# Patient Record
Sex: FEMALE | Race: WHITE | Employment: UNEMPLOYED | URBAN - METROPOLITAN AREA
[De-identification: names, ages, dates, MRNs, and addresses within clinical notes are randomized per-mention and may not be internally consistent; named-entity substitution may affect disease eponyms.]

---

## 2020-12-28 ENCOUNTER — APPOINTMENT (OUTPATIENT)
Dept: GENERAL RADIOLOGY | Age: 50
End: 2020-12-28
Payer: MEDICAID

## 2020-12-28 ENCOUNTER — APPOINTMENT (OUTPATIENT)
Dept: MRI IMAGING | Age: 50
End: 2020-12-28
Payer: MEDICAID

## 2020-12-28 ENCOUNTER — HOSPITAL ENCOUNTER (EMERGENCY)
Age: 50
Discharge: HOME | End: 2020-12-28
Attending: EMERGENCY MEDICINE
Payer: MEDICAID

## 2020-12-28 ENCOUNTER — APPOINTMENT (OUTPATIENT)
Dept: CT IMAGING | Age: 50
End: 2020-12-28
Payer: MEDICAID

## 2020-12-28 VITALS
SYSTOLIC BLOOD PRESSURE: 107 MMHG | DIASTOLIC BLOOD PRESSURE: 88 MMHG | OXYGEN SATURATION: 99 % | HEART RATE: 89 BPM | HEIGHT: 68 IN | RESPIRATION RATE: 13 BRPM | BODY MASS INDEX: 24.25 KG/M2 | WEIGHT: 160 LBS | TEMPERATURE: 98 F

## 2020-12-28 DIAGNOSIS — R91.1 PULMONARY NODULE: ICD-10-CM

## 2020-12-28 DIAGNOSIS — D53.9 MACROCYTIC ANEMIA: ICD-10-CM

## 2020-12-28 DIAGNOSIS — R52 GENERALIZED BODY ACHES: Primary | ICD-10-CM

## 2020-12-28 DIAGNOSIS — J44.1 COPD EXACERBATION (HCC): ICD-10-CM

## 2020-12-28 DIAGNOSIS — M54.50 BILATERAL LOW BACK PAIN WITHOUT SCIATICA, UNSPECIFIED CHRONICITY: ICD-10-CM

## 2020-12-28 LAB
A/G RATIO: 1.4 (ref 1.1–2.2)
ALBUMIN SERPL-MCNC: 3.8 G/DL (ref 3.4–5)
ALP BLD-CCNC: 75 U/L (ref 40–129)
ALT SERPL-CCNC: 9 U/L (ref 10–40)
AMPHETAMINE SCREEN, URINE: NORMAL
ANION GAP SERPL CALCULATED.3IONS-SCNC: 8 MMOL/L (ref 3–16)
AST SERPL-CCNC: 13 U/L (ref 15–37)
BARBITURATE SCREEN URINE: NORMAL
BASOPHILS ABSOLUTE: 0 K/UL (ref 0–0.2)
BASOPHILS RELATIVE PERCENT: 0.7 %
BENZODIAZEPINE SCREEN, URINE: NORMAL
BILIRUB SERPL-MCNC: <0.2 MG/DL (ref 0–1)
BILIRUBIN URINE: NEGATIVE
BLOOD, URINE: ABNORMAL
BUN BLDV-MCNC: 30 MG/DL (ref 7–20)
CALCIUM SERPL-MCNC: 9.2 MG/DL (ref 8.3–10.6)
CANNABINOID SCREEN URINE: NORMAL
CHLORIDE BLD-SCNC: 103 MMOL/L (ref 99–110)
CLARITY: CLEAR
CO2: 27 MMOL/L (ref 21–32)
COCAINE METABOLITE SCREEN URINE: NORMAL
COLOR: YELLOW
CREAT SERPL-MCNC: 1.3 MG/DL (ref 0.6–1.1)
EKG ATRIAL RATE: 83 BPM
EKG DIAGNOSIS: NORMAL
EKG P AXIS: 67 DEGREES
EKG P-R INTERVAL: 170 MS
EKG Q-T INTERVAL: 366 MS
EKG QRS DURATION: 82 MS
EKG QTC CALCULATION (BAZETT): 430 MS
EKG R AXIS: 58 DEGREES
EKG T AXIS: 52 DEGREES
EKG VENTRICULAR RATE: 83 BPM
EOSINOPHILS ABSOLUTE: 0.3 K/UL (ref 0–0.6)
EOSINOPHILS RELATIVE PERCENT: 5.6 %
EPITHELIAL CELLS, UA: NORMAL /HPF (ref 0–5)
FIBRINOGEN: 366 MG/DL (ref 200–397)
FOLATE: 8.43 NG/ML (ref 4.78–24.2)
GFR AFRICAN AMERICAN: 52
GFR NON-AFRICAN AMERICAN: 43
GLOBULIN: 2.7 G/DL
GLUCOSE BLD-MCNC: 88 MG/DL (ref 70–99)
GLUCOSE URINE: NEGATIVE MG/DL
HCG QUALITATIVE: NEGATIVE
HCT VFR BLD CALC: 25.6 % (ref 36–48)
HEMOGLOBIN: 8.6 G/DL (ref 12–16)
INR BLD: 1.08 (ref 0.86–1.14)
IRON SATURATION: 32 % (ref 15–50)
IRON: 78 UG/DL (ref 37–145)
KETONES, URINE: NEGATIVE MG/DL
LACTATE DEHYDROGENASE: 152 U/L (ref 100–190)
LEUKOCYTE ESTERASE, URINE: ABNORMAL
LYMPHOCYTES ABSOLUTE: 1.5 K/UL (ref 1–5.1)
LYMPHOCYTES RELATIVE PERCENT: 27.6 %
Lab: NORMAL
MCH RBC QN AUTO: 33.9 PG (ref 26–34)
MCHC RBC AUTO-ENTMCNC: 33.5 G/DL (ref 31–36)
MCV RBC AUTO: 101.4 FL (ref 80–100)
METHADONE SCREEN, URINE: NORMAL
MICROSCOPIC EXAMINATION: YES
MONOCYTES ABSOLUTE: 0.5 K/UL (ref 0–1.3)
MONOCYTES RELATIVE PERCENT: 8.9 %
NEUTROPHILS ABSOLUTE: 3.1 K/UL (ref 1.7–7.7)
NEUTROPHILS RELATIVE PERCENT: 57.2 %
NITRITE, URINE: NEGATIVE
OPIATE SCREEN URINE: NORMAL
OXYCODONE URINE: NORMAL
PDW BLD-RTO: 13.3 % (ref 12.4–15.4)
PH UA: 6
PH UA: 6 (ref 5–8)
PHENCYCLIDINE SCREEN URINE: NORMAL
PLATELET # BLD: 243 K/UL (ref 135–450)
PMV BLD AUTO: 7.4 FL (ref 5–10.5)
POTASSIUM REFLEX MAGNESIUM: 4.2 MMOL/L (ref 3.5–5.1)
PRO-BNP: 89 PG/ML (ref 0–124)
PROCALCITONIN: 0.04 NG/ML (ref 0–0.15)
PROPOXYPHENE SCREEN: NORMAL
PROTEIN UA: NEGATIVE MG/DL
PROTHROMBIN TIME: 12.5 SEC (ref 10–13.2)
RBC # BLD: 2.52 M/UL (ref 4–5.2)
RBC UA: NORMAL /HPF (ref 0–4)
SARS-COV-2, NAAT: NOT DETECTED
SEDIMENTATION RATE, ERYTHROCYTE: 42 MM/HR (ref 0–30)
SODIUM BLD-SCNC: 138 MMOL/L (ref 136–145)
SPECIFIC GRAVITY UA: 1.01 (ref 1–1.03)
TOTAL IRON BINDING CAPACITY: 247 UG/DL (ref 260–445)
TOTAL PROTEIN: 6.5 G/DL (ref 6.4–8.2)
TROPONIN: <0.01 NG/ML
TSH REFLEX: 0.47 UIU/ML (ref 0.27–4.2)
URINE REFLEX TO CULTURE: ABNORMAL
URINE TYPE: ABNORMAL
UROBILINOGEN, URINE: 0.2 E.U./DL
VITAMIN B-12: 296 PG/ML (ref 211–911)
VITAMIN D 25-HYDROXY: 20.1 NG/ML
WBC # BLD: 5.4 K/UL (ref 4–11)
WBC UA: NORMAL /HPF (ref 0–5)

## 2020-12-28 PROCEDURE — 84443 ASSAY THYROID STIM HORMONE: CPT

## 2020-12-28 PROCEDURE — 84484 ASSAY OF TROPONIN QUANT: CPT

## 2020-12-28 PROCEDURE — 85384 FIBRINOGEN ACTIVITY: CPT

## 2020-12-28 PROCEDURE — 83550 IRON BINDING TEST: CPT

## 2020-12-28 PROCEDURE — 96375 TX/PRO/DX INJ NEW DRUG ADDON: CPT

## 2020-12-28 PROCEDURE — U0003 INFECTIOUS AGENT DETECTION BY NUCLEIC ACID (DNA OR RNA); SEVERE ACUTE RESPIRATORY SYNDROME CORONAVIRUS 2 (SARS-COV-2) (CORONAVIRUS DISEASE [COVID-19]), AMPLIFIED PROBE TECHNIQUE, MAKING USE OF HIGH THROUGHPUT TECHNOLOGIES AS DESCRIBED BY CMS-2020-01-R: HCPCS

## 2020-12-28 PROCEDURE — 6360000004 HC RX CONTRAST MEDICATION: Performed by: EMERGENCY MEDICINE

## 2020-12-28 PROCEDURE — 6370000000 HC RX 637 (ALT 250 FOR IP): Performed by: PHYSICIAN ASSISTANT

## 2020-12-28 PROCEDURE — 72158 MRI LUMBAR SPINE W/O & W/DYE: CPT

## 2020-12-28 PROCEDURE — 85025 COMPLETE CBC W/AUTO DIFF WBC: CPT

## 2020-12-28 PROCEDURE — 2580000003 HC RX 258: Performed by: PHYSICIAN ASSISTANT

## 2020-12-28 PROCEDURE — 6370000000 HC RX 637 (ALT 250 FOR IP): Performed by: EMERGENCY MEDICINE

## 2020-12-28 PROCEDURE — 93010 ELECTROCARDIOGRAM REPORT: CPT | Performed by: INTERNAL MEDICINE

## 2020-12-28 PROCEDURE — 83615 LACTATE (LD) (LDH) ENZYME: CPT

## 2020-12-28 PROCEDURE — U0002 COVID-19 LAB TEST NON-CDC: HCPCS

## 2020-12-28 PROCEDURE — 84145 PROCALCITONIN (PCT): CPT

## 2020-12-28 PROCEDURE — 72157 MRI CHEST SPINE W/O & W/DYE: CPT

## 2020-12-28 PROCEDURE — 85610 PROTHROMBIN TIME: CPT

## 2020-12-28 PROCEDURE — 80053 COMPREHEN METABOLIC PANEL: CPT

## 2020-12-28 PROCEDURE — 82728 ASSAY OF FERRITIN: CPT

## 2020-12-28 PROCEDURE — 82746 ASSAY OF FOLIC ACID SERUM: CPT

## 2020-12-28 PROCEDURE — 81001 URINALYSIS AUTO W/SCOPE: CPT

## 2020-12-28 PROCEDURE — A9579 GAD-BASE MR CONTRAST NOS,1ML: HCPCS | Performed by: EMERGENCY MEDICINE

## 2020-12-28 PROCEDURE — 85652 RBC SED RATE AUTOMATED: CPT

## 2020-12-28 PROCEDURE — 71045 X-RAY EXAM CHEST 1 VIEW: CPT

## 2020-12-28 PROCEDURE — 96374 THER/PROPH/DIAG INJ IV PUSH: CPT

## 2020-12-28 PROCEDURE — 6360000002 HC RX W HCPCS: Performed by: EMERGENCY MEDICINE

## 2020-12-28 PROCEDURE — 86140 C-REACTIVE PROTEIN: CPT

## 2020-12-28 PROCEDURE — 80307 DRUG TEST PRSMV CHEM ANLYZR: CPT

## 2020-12-28 PROCEDURE — 71260 CT THORAX DX C+: CPT

## 2020-12-28 PROCEDURE — 83880 ASSAY OF NATRIURETIC PEPTIDE: CPT

## 2020-12-28 PROCEDURE — 93005 ELECTROCARDIOGRAM TRACING: CPT | Performed by: PHYSICIAN ASSISTANT

## 2020-12-28 PROCEDURE — 82306 VITAMIN D 25 HYDROXY: CPT

## 2020-12-28 PROCEDURE — 6360000002 HC RX W HCPCS: Performed by: PHYSICIAN ASSISTANT

## 2020-12-28 PROCEDURE — 82607 VITAMIN B-12: CPT

## 2020-12-28 PROCEDURE — 84703 CHORIONIC GONADOTROPIN ASSAY: CPT

## 2020-12-28 PROCEDURE — 83540 ASSAY OF IRON: CPT

## 2020-12-28 PROCEDURE — 99285 EMERGENCY DEPT VISIT HI MDM: CPT

## 2020-12-28 RX ORDER — DOXYCYCLINE HYCLATE 100 MG
100 TABLET ORAL 2 TIMES DAILY
Qty: 20 TABLET | Refills: 0 | Status: SHIPPED | OUTPATIENT
Start: 2020-12-28 | End: 2021-01-07

## 2020-12-28 RX ORDER — DEXAMETHASONE SODIUM PHOSPHATE 10 MG/ML
4 INJECTION, SOLUTION INTRAMUSCULAR; INTRAVENOUS ONCE
Status: COMPLETED | OUTPATIENT
Start: 2020-12-28 | End: 2020-12-28

## 2020-12-28 RX ORDER — FENTANYL CITRATE 50 UG/ML
50 INJECTION, SOLUTION INTRAMUSCULAR; INTRAVENOUS ONCE
Status: COMPLETED | OUTPATIENT
Start: 2020-12-28 | End: 2020-12-28

## 2020-12-28 RX ORDER — 0.9 % SODIUM CHLORIDE 0.9 %
1000 INTRAVENOUS SOLUTION INTRAVENOUS ONCE
Status: COMPLETED | OUTPATIENT
Start: 2020-12-28 | End: 2020-12-28

## 2020-12-28 RX ORDER — PREDNISONE 10 MG/1
TABLET ORAL
Qty: 30 TABLET | Refills: 0 | Status: SHIPPED | OUTPATIENT
Start: 2020-12-28 | End: 2021-01-07

## 2020-12-28 RX ORDER — ALBUTEROL SULFATE 90 UG/1
2 AEROSOL, METERED RESPIRATORY (INHALATION) EVERY 4 HOURS PRN
Qty: 1 INHALER | Refills: 1 | Status: SHIPPED | OUTPATIENT
Start: 2020-12-28

## 2020-12-28 RX ORDER — ACETAMINOPHEN 325 MG/1
650 TABLET ORAL ONCE
Status: COMPLETED | OUTPATIENT
Start: 2020-12-28 | End: 2020-12-28

## 2020-12-28 RX ORDER — IPRATROPIUM BROMIDE AND ALBUTEROL SULFATE 2.5; .5 MG/3ML; MG/3ML
1 SOLUTION RESPIRATORY (INHALATION) ONCE
Status: COMPLETED | OUTPATIENT
Start: 2020-12-28 | End: 2020-12-28

## 2020-12-28 RX ADMIN — FENTANYL CITRATE 50 MCG: 50 INJECTION, SOLUTION INTRAMUSCULAR; INTRAVENOUS at 16:54

## 2020-12-28 RX ADMIN — GADOTERIDOL 14 ML: 279.3 INJECTION, SOLUTION INTRAVENOUS at 18:29

## 2020-12-28 RX ADMIN — IPRATROPIUM BROMIDE AND ALBUTEROL SULFATE 1 AMPULE: .5; 2.5 SOLUTION RESPIRATORY (INHALATION) at 21:45

## 2020-12-28 RX ADMIN — SODIUM CHLORIDE 1000 ML: 9 INJECTION, SOLUTION INTRAVENOUS at 14:45

## 2020-12-28 RX ADMIN — HYDROMORPHONE HYDROCHLORIDE 0.5 MG: 1 INJECTION, SOLUTION INTRAMUSCULAR; INTRAVENOUS; SUBCUTANEOUS at 14:45

## 2020-12-28 RX ADMIN — DEXAMETHASONE SODIUM PHOSPHATE 4 MG: 10 INJECTION, SOLUTION INTRAMUSCULAR; INTRAVENOUS at 21:43

## 2020-12-28 RX ADMIN — IOPAMIDOL 85 ML: 755 INJECTION, SOLUTION INTRAVENOUS at 15:30

## 2020-12-28 RX ADMIN — ACETAMINOPHEN 650 MG: 325 TABLET ORAL at 14:45

## 2020-12-28 ASSESSMENT — PAIN DESCRIPTION - PAIN TYPE: TYPE: ACUTE PAIN

## 2020-12-28 ASSESSMENT — PAIN DESCRIPTION - LOCATION: LOCATION: BACK

## 2020-12-28 ASSESSMENT — PAIN SCALES - GENERAL
PAINLEVEL_OUTOF10: 8
PAINLEVEL_OUTOF10: 6

## 2020-12-28 ASSESSMENT — PAIN DESCRIPTION - DESCRIPTORS: DESCRIPTORS: BURNING

## 2020-12-28 NOTE — ED NOTES
Pt ambulated to restroom with pulse ox and dropped to 70's at this time, pt is slowly recovering on her own.  Dr. Calin Dozier made aware      Helen Hamman, RN  12/28/20 2349

## 2020-12-28 NOTE — ED PROVIDER NOTES
[address] ED  EMERGENCY DEPARTMENT ENCOUNTER        Pt Name: Debora Caicedo  MRN: [id number]  Justinegflucía 1970  Date of evaluation: 12/28/2020  Provider: Radha Beard PA-C  PCP: No primary care provider on file. I have seen and evaluated this patient with my supervising physician Nahid Idalmis, [facility]       Chief Complaint   Patient presents with    Concern For COVID-19     chest pain, sob and body aches.  had an exposur. e to covid       HISTORY OF PRESENT ILLNESS   (Location, Timing/Onset, Context/Setting, Quality, Duration, Modifying Factors, Severity, Associated Signs and Symptoms)  Note limiting factors. Debora Caicedo is a 48 y.o. 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 Last used 28 days ago. Patient states over the past 72 hours she has had progressive mid back pain mostly toward the left. Staff there thought she might have unerupted shingles or internal shingles. She never did have a clear rash though she is uncertain. She has no rash at this time. She indicates general body pain and general body burning. She indicates she feels somewhat short of breath particularly when she exerts. She does have stairs up to her area and this does not appreciably increase her shortness of breath. She does however have history of COPD/asthma and she is a current smoker. She indicates no chest pain or pleuritic pain. She indicates no fevers or chills. The patient is intolerant to aspirin and NSAIDs. She is anemic and she is aware that she is anemic for quite some time. She indicates her stool is brown in color. She does have known history hypertension and currently on lisinopril 5 mg. At this time she is hypotensive but not symptomatic. She reports no history of DVT or PE.     There was initial concern regarding Covid because she has myalgia and shortness of breath. Patient's initial blood pressure 77/57 with pulse 84. She is not on beta-blocker. Her care medications include buprenorphine in 8/2, hydroxyzine 25, Mirapex 0.25 mg butyryl MDI as needed, omeprazole 20 mg, levothyroxine 75 mcg, lisinopril 5 mg, melatonin 5 mg, trazodone 50 mg, Claritin 10 mg, Cymbalta 30 mg, Lipitor 20 mg, naproxen 500 mg, Zoloft 25 mg and gabapentin 400 mg. Nursing Notes were all reviewed and agreed with or any disagreements were addressed in the HPI. REVIEW OF SYSTEMS    (2-9 systems for level 4, 10 or more for level 5)     Review of Systems    Positives and Pertinent negatives as per HPI. Except as noted above in the ROS, all other systems were reviewed and negative. PAST MEDICAL HISTORY     Past Medical History:   Diagnosis Date    [facility]     COPD (chronic obstructive pulmonary disease) ([facility] [facility]          SURGICAL HISTORY     Past Surgical History:   Procedure Laterality Date    CHOLECYSTECTOMY      HYSTERECTOMY      THYROIDECTOMY           CURRENTMEDICATIONS       Discharge Medication List as of 12/28/2020 10:05 PM            ALLERGIES     Aspirin, Toradol [ketorolac tromethamine], Tramadol, and Vicodin [hydrocodone-acetaminophen]    FAMILYHISTORY     History reviewed. No pertinent family history. SOCIAL HISTORY       Social History     Tobacco Use    Smoking status: Current Every Day Smoker   Substance Use Topics    Alcohol use: Never     Frequency: Never    Drug use: Never       SCREENINGS             PHYSICAL EXAM    (up to 7 for level 4, 8 or more for level 5)     ED Triage Vitals   BP Temp Temp Source Pulse Resp SpO2 Height Weight   12/28/20 1302 12/28/20 1300 12/28/20 1300 12/28/20 1300 12/28/20 1300 12/28/20 1300 12/28/20 1300 12/28/20 1300   (!) 77/57 98 °F (36.7 °C) Oral 84 18 97 % 5' 8\" (1.727 m) 160 lb (72.6 kg)       Physical Exam  Vitals signs and nursing note reviewed. Constitutional:       Appearance: Normal appearance. She is well-developed and normal weight. HENT:      Head: Normocephalic and atraumatic. Right Ear: External ear normal.      Left Ear: External ear normal.   Eyes:      General: No scleral icterus. Right eye: No discharge. Left eye: No discharge. Conjunctiva/sclera: Conjunctivae normal.   Neck:      Musculoskeletal: Normal range of motion and neck supple. Cardiovascular:      Rate and Rhythm: Normal rate and regular rhythm. Heart sounds: Normal heart sounds. Pulmonary:      Effort: Pulmonary effort is normal.      Breath sounds: Normal breath sounds. Abdominal:      General: Abdomen is flat. Bowel sounds are normal.   Musculoskeletal: Normal range of motion. General: Tenderness present. Back:       Right lower leg: No edema. Left lower leg: No edema. Skin:     General: Skin is warm and dry. Findings: No rash. Comments: The patient without neurosensory deficit to suggest skin hypersensitivity. Neurological:      General: No focal deficit present. Mental Status: She is alert and oriented to person, place, and time. Mental status is at baseline. Psychiatric:         Mood and Affect: Mood normal.         Behavior: Behavior normal.         Thought Content:  Thought content normal.         Judgment: Judgment normal.         DIAGNOSTIC RESULTS   LABS:    Labs Reviewed   VITAMIN D 25 HYDROXY - Abnormal; Notable for the following components:       Result Value    Vit D, 25-Hydroxy 20.1 (*)     All other components within normal limits    Narrative:     Performed at:  [address]   Phone [phone]   CBC WITH AUTO DIFFERENTIAL - Abnormal; Notable for the following components:    RBC 2.52 (*)     Hemoglobin 8.6 (*)     Hematocrit 25.6 (*)     .4 (*)     All other components within normal limits    Narrative:     Performed at:  [facility]  [facility]   Phone [phone]   COMPREHENSIVE METABOLIC PANEL W/ REFLEX TO MG FOR LOW K - Abnormal; Notable for the following components:    BUN 30 (*)     CREATININE 1.3 (*)     GFR Non- 43 (*)     GFR  52 (*)     ALT 9 (*)     AST 13 (*)     All other components within normal limits    Narrative:     Performed at:  [facility],  [place], [facility]   Phone [phone] - Abnormal; Notable for the following components:    Ferritin 199.3 (*)     All other components within normal limits    Narrative:     Performed at:  [facility]  [address] [place] [employer] [employer] [facility] [address]   Phone [phone]   IRON AND TIBC - Abnormal; Notable for the following components:    TIBC 247 (*)     All other components within normal limits    Narrative:     Performed at:  [facility]  [address], [address] [facility] [address]   Phone [phone]   URINE RT REFLEX TO CULTURE - Abnormal; Notable for the following components:    Blood, Urine SMALL (*)     Leukocyte Esterase, Urine TRACE (*)     All other components within normal limits    Narrative:     Performed at:  [facility]) - [facility],  [place], [facility]   Phone [phone]   SEDIMENTATION RATE - Abnormal; Notable for the following components:    Sed Rate 42 (*)     All other components within normal limits    Narrative:     Performed at:  [facility],  [place], [facility]   Phone [phone]   TROPONIN    Narrative:     Performed at:  [facility],  [place], [facility]   Phone [phone]   BRAIN NATRIURETIC PEPTIDE    Narrative:     Performed at:  [facility]) - [facility],  [place], [facility] [address]   Phone [phone]   PROTIME-INR    Narrative:     Performed at:  [facility]) - [facility],  [place], [facility]   Phone [phone]   C-REACTIVE PROTEIN    Narrative:     Performed at:  [facility]  [address] [place] [facility] [place] [address]   Phone [phone]   LACTATE DEHYDROGENASE    Narrative:     Performed at:  [facility],  [place], [facility]   Phone [phone]   FIBRINOGEN    Narrative:     Performed at:  [address] [address],  [place], [facility]   Phone [phone]   PROCALCITONIN    Narrative:     Performed at:  [facility],  [place], [facility]   Phone [phone]    Narrative:     Performed at:  [address] [address],  [place], [facility]   Phone [phone]   TSH WITH REFLEX    Narrative:     Performed at:  [address] [place] [address],  [place], [facility]   Phone [phone]   URINE DRUG SCREEN    Narrative:     Performed at:  [address] [place] [address],  [place], [facility]   Phone [phone]   MICROSCOPIC URINALYSIS    Narrative:     Performed at:  [address] [place] [address],  [place], [facility]   Phone [phone]   HCG, SERUM, QUALITATIVE    Narrative:     Performed at:  [address] [place] [address],  [place], [facility]   Phone [phone]   VITAMIN B12 & FOLATE    Narrative:     Performed at:  [facility]  [address] [facility] [place] [address]   Phone [phone]    Narrative:     Performed at:  [facility] [address]  [address] [facility] [place] [address]   Phone [phone]       All other labs were within normal range or not returned as of this dictation. EKG: All EKG's are interpreted by the Emergency Department Physician in the absence of a cardiologist.  Please see their note for interpretation of EKG. RADIOLOGY:   Non-plain film images such as CT, Ultrasound and MRI are read by the radiologist. Plain radiographic images are visualized and preliminarily interpreted by the ED Provider with the below findings:        Interpretation per the Radiologist below, if available at the time of this note:    MRI [address]   Final Result   1. No evidence to suggest an infectious process involving the thoracolumbar   spine. 2. Degenerative changes contribute to neural foraminal narrowing of the   lumbar spine as above. 3. No significant spinal canal stenosis of the thoracolumbar spine. MRI LUMBAR SPINE W WO CONTRAST   Final Result   1. No evidence to suggest an infectious process involving the thoracolumbar   spine. 2. Degenerative changes contribute to neural foraminal narrowing of the   lumbar spine as above. 3. No significant spinal canal stenosis of the thoracolumbar spine. CT CHEST PULMONARY EMBOLISM W CONTRAST   Final Result   1. 3 mm solid right middle lobe pulmonary nodule. RECOMMENDATION:   Fleischner Society guidelines for follow-up and management of incidentally   detected pulmonary nodules:      Single Solid Nodule:      Nodule size less than 6 mm   In a low-risk patient, no routine follow-up. In a high-risk patient, optional CT at 12 months. XR CHEST PORTABLE   Final Result   No consolidation. Trace scoliosis. Xr Chest Portable    Result Date: 12/28/2020  EXAMINATION: ONE XRAY VIEW OF THE CHEST 12/28/2020 1:13 pm COMPARISON: None.  HISTORY: ORDERING SYSTEM PROVIDED HISTORY: Shortness of breath TECHNOLOGIST PROVIDED HISTORY: Reason for exam:->Shortness of breath Reason for Exam: shortness of breath Acuity: Acute Type of Exam: Initial FINDINGS: The lungs are clear. The cardiac silhouette is unremarkable. There are no pleural effusions. There is no pneumothorax. No consolidation. Trace scoliosis. PROCEDURES   Unless otherwise noted below, none     Procedures    CRITICAL CARE TIME   N/A    CONSULTS:  IP CONSULT TO HOSPITALIST      EMERGENCY DEPARTMENT COURSE and DIFFERENTIAL DIAGNOSIS/MDM:   Vitals:    Vitals:    12/28/20 1506 12/28/20 1634 12/28/20 2035 12/28/20 2108   BP: 102/74 100/69 100/66 107/88   Pulse: 80 79 74 89   Resp: 16 18 9 13   Temp:       TempSrc:       SpO2: 98% 100% 98% 99%   Weight:       Height:           Patient was given the following medications:  Medications   acetaminophen (TYLENOL) tablet 650 mg (650 mg Oral Given 12/28/20 1445)   0.9 % sodium chloride bolus (0 mLs Intravenous Stopped 12/28/20 1548)   HYDROmorphone (DILAUDID) injection 0.5 mg (0.5 mg Intravenous Given 12/28/20 1445)   iopamidol (ISOVUE-370) 76 % injection 85 mL (85 mLs Intravenous Given 12/28/20 1530)   fentaNYL (SUBLIMAZE) injection 50 mcg (50 mcg Intravenous Given 12/28/20 1654)   gadoteridol (PROHANCE) injection 14 mL (14 mLs Intravenous Given 12/28/20 1829)   dexamethasone (PF) (DECADRON) injection 4 mg (4 mg Intravenous Given 12/28/20 2143)   ipratropium-albuterol (DUONEB) nebulizer solution 1 ampule (1 ampule Inhalation Given 12/28/20 2145)         My shift ends and the final disposition of this patient will be managed by my attending physician. FINAL IMPRESSION      1. Generalized body aches    2. COPD exacerbation ([address]    3. Bilateral low back pain without sciatica, unspecified chronicity    4. Pulmonary nodule    5.  Macrocytic anemia          DISPOSITION/PLAN   DISPOSITION        PATIENT REFERREDTO:  [facility]) Pre-Services  [phone]          DISCHARGE MEDICATIONS:  Discharge Medication List as of 12/28/2020 10:05 PM      START taking these medications    Details   predniSONE (DELTASONE) 10 MG tablet Take 5 tablets by mouth daily for 2 days, THEN 4 tablets daily for 2 days, THEN 3 tablets daily for 2 days, THEN 2 tablets daily for 2 days, THEN 1 tablet daily for 2 days. , Disp-30 tablet, R-0Print      albuterol sulfate HFA (PROVENTIL HFA) 108 (90 Base) MCG/ACT inhaler Inhale 2 puffs into the lungs every 4 hours as needed for Wheezing, Disp-1 Inhaler, R-1Print      doxycycline hyclate (VIBRA-TABS) 100 MG tablet Take 1 tablet by mouth 2 times daily for 10 days, Disp-20 tablet, R-0Print             DISCONTINUED MEDICATIONS:  Discharge Medication List as of 12/28/2020 10:05 PM                 (Please note that portions of this note were completed with a voice recognition program.  Efforts were made to edit the dictations but occasionally words are mis-transcribed. )    Gabriela Jenkins PA-C (electronically signed)           Gabriela Jenkins PA-C  01/04/21 [id number]

## 2020-12-29 ENCOUNTER — CARE COORDINATION (OUTPATIENT)
Dept: CARE COORDINATION | Age: 50
End: 2020-12-29

## 2020-12-29 LAB
C-REACTIVE PROTEIN: 1.3 MG/L (ref 0–5.1)
FERRITIN: 199.3 NG/ML (ref 15–150)
SARS-COV-2: NOT DETECTED

## 2020-12-29 NOTE — CARE COORDINATION
No additional outreach. Spoke with the daughter at home and she told me the patient was currently at rehab facility.

## 2020-12-29 NOTE — ED NOTES
Pt ambulated around nurse station with pulse ox at this time and pt sats stayed 93% and above the whole time, pt tolerated well. Pt stated she was feeling much better. Dr. Donta Silverman made aware.       Marika Nguyen RN  12/28/20 2902

## 2020-12-30 NOTE — ED PROVIDER NOTES
I independently interviewed, examined and evaluated Kevin Shah. In brief, the patient is a 40-year-old female presenting with multiple symptoms. Primarily she describes diffuse body pain and aches. She states that she is currently in a recovery center as she is undergoing detox for IV fentanyl use. Her last use was 28 days ago. She states that she was exposed to someone with Covid and was concerned. She also describes some increased dyspnea on exertion, states she has a history of COPD. She denies cough or fever. She also describes back pain. Describes it in her mid thoracic and left sided region. She denies bowel or bladder incontinence, numbness or weakness of the arms or legs, or saddle anesthesia. She does have the history of IV drug use. She denies abdominal pain or vomiting. Denies chest pain. On exam, she is nontoxic-appearing. Lungs are diminished diffusely with diffuse expiratory wheezes. She does not have any rales or rhonchi, no conversational dyspnea or accessory muscle use. Heart is regular rate and rhythm without murmurs. Abdomen is soft nontender nondistended. The patient has tenderness to palpation mid thoracic spine midline, as well as left paraspinal region. High Sensitivity Neuro Exam (HSNE) Spine  If Lumbar Pain:  L1-L2 Cremasteric Reflex (inner thigh sensation): Present  L2 Adduct Thigh (cross legs): Present  L3 Extend Knee: Present  L4 Dorsiflex Ankle (Up): Present  L5 Point Great Toe Up: Present  L2 L3-L4 Knee Reflex: Present  S1 Flex Knee: Present  S2 Plantarflex Toes: Present     If Thoracic Back Pain (also check femoral pulses):  T1 Move fingers apart: Present  T2-T12 Trunk Sensation: Present    Blood pressure 107/88, pulse 89, temperature 98 °F (36.7 °C), temperature source Oral, resp. rate 13, height 5' 8\" (1.727 m), weight 160 lb (72.6 kg), SpO2 99 %.     The Ekg interpreted by me shows  normal sinus rhythm with a rate of 83  Axis is   Normal  QTc is  within an acceptable range  Intervals and Durations are unremarkable. ST Segments: normal      ED course: Patient is a 80-year-old female presenting with multiple symptoms. Her blood pressure is initially hypotensive, she states her blood pressure is always low and denies any dizziness or lightheadedness. She is given fluids and blood pressure does improve, remains normotensive here. She is 97% on room air. There was a  pulse ox reading that was 70% on exertion. Otherwise patient is not tachycardic or febrile. She is well-appearing. She has a work-up that is obtained. Her thoracic back pain is likely musculoskeletal, however other etiologies need to be considered. Since she is hypotensive and hypoxic on exertion, CT PE is ordered. This is negative for PE or acute pulmonary pathology, pulmonary nodule is noted and this finding is related the patient and she needs to follow-up. She is given steroids, and DuoNeb here with much improvement. She is ambulated multiple other times additionally, has normal pulse ox readings I do suspect this 70% may have been false initially. From the standpoint of back pain, the patient does have a red flag score of 3 and therefore MRI of thoracic and lumbar spine ordered. These are negative for spinal abscess, osteomyelitis or acute process that would explain the back pain. There is no evidence of acute surgical process. I do suspect that the pain is musculoskeletal.  She is otherwise neurologically intact. UA is negative. Otherwise rapid Covid is negative. Patient is found to be anemic, no sign of active hemorrhage today I do feel she can follow-up outpatient for this. She is going be discharged home with treatment for COPD exacerbation. She is given steroids, inhaler, and doxycycline. She also is given referral for primary care. She is given strict return precautions and voices understanding.     Red Flags  Minor (1 Point Each)  Alcohol Abuse: +0 No  Diabetes

## 2021-01-11 ENCOUNTER — APPOINTMENT (OUTPATIENT)
Dept: GENERAL RADIOLOGY | Age: 51
DRG: 720 | End: 2021-01-11
Payer: MEDICAID

## 2021-01-11 ENCOUNTER — APPOINTMENT (OUTPATIENT)
Dept: CT IMAGING | Age: 51
DRG: 720 | End: 2021-01-11
Payer: MEDICAID

## 2021-01-11 ENCOUNTER — HOSPITAL ENCOUNTER (INPATIENT)
Age: 51
LOS: 1 days | Discharge: LEFT BEFORE DISCHARGE | DRG: 720 | End: 2021-01-12
Attending: EMERGENCY MEDICINE | Admitting: INTERNAL MEDICINE
Payer: MEDICAID

## 2021-01-11 DIAGNOSIS — I95.9 HYPOTENSION, UNSPECIFIED HYPOTENSION TYPE: ICD-10-CM

## 2021-01-11 DIAGNOSIS — J18.9 PNEUMONIA OF RIGHT UPPER LOBE DUE TO INFECTIOUS ORGANISM: Primary | ICD-10-CM

## 2021-01-11 PROBLEM — A41.9 SEPSIS (HCC): Status: ACTIVE | Noted: 2021-01-11

## 2021-01-11 LAB
A/G RATIO: 1.1 (ref 1.1–2.2)
ALBUMIN SERPL-MCNC: 3 G/DL (ref 3.4–5)
ALP BLD-CCNC: 73 U/L (ref 40–129)
ALT SERPL-CCNC: 8 U/L (ref 10–40)
AMPHETAMINE SCREEN, URINE: NORMAL
ANION GAP SERPL CALCULATED.3IONS-SCNC: 8 MMOL/L (ref 3–16)
AST SERPL-CCNC: 9 U/L (ref 15–37)
BARBITURATE SCREEN URINE: NORMAL
BASOPHILS ABSOLUTE: 0.1 K/UL (ref 0–0.2)
BASOPHILS RELATIVE PERCENT: 0.7 %
BENZODIAZEPINE SCREEN, URINE: NORMAL
BILIRUB SERPL-MCNC: <0.2 MG/DL (ref 0–1)
BILIRUBIN URINE: NEGATIVE
BLOOD, URINE: ABNORMAL
BUN BLDV-MCNC: 32 MG/DL (ref 7–20)
CALCIUM SERPL-MCNC: 8.9 MG/DL (ref 8.3–10.6)
CANNABINOID SCREEN URINE: NORMAL
CHLORIDE BLD-SCNC: 101 MMOL/L (ref 99–110)
CLARITY: CLEAR
CO2: 26 MMOL/L (ref 21–32)
COCAINE METABOLITE SCREEN URINE: NORMAL
COLOR: YELLOW
CREAT SERPL-MCNC: 1.4 MG/DL (ref 0.6–1.1)
EKG ATRIAL RATE: 77 BPM
EKG DIAGNOSIS: NORMAL
EKG P AXIS: 68 DEGREES
EKG P-R INTERVAL: 154 MS
EKG Q-T INTERVAL: 380 MS
EKG QRS DURATION: 80 MS
EKG QTC CALCULATION (BAZETT): 430 MS
EKG R AXIS: 69 DEGREES
EKG T AXIS: 54 DEGREES
EKG VENTRICULAR RATE: 77 BPM
EOSINOPHILS ABSOLUTE: 0.5 K/UL (ref 0–0.6)
EOSINOPHILS RELATIVE PERCENT: 5.3 %
EPITHELIAL CELLS, UA: ABNORMAL /HPF (ref 0–5)
GFR AFRICAN AMERICAN: 48
GFR NON-AFRICAN AMERICAN: 40
GLOBULIN: 2.8 G/DL
GLUCOSE BLD-MCNC: 96 MG/DL (ref 70–99)
GLUCOSE URINE: NEGATIVE MG/DL
HCT VFR BLD CALC: 24.5 % (ref 36–48)
HEMOGLOBIN: 8.3 G/DL (ref 12–16)
KETONES, URINE: NEGATIVE MG/DL
LACTIC ACID: 0.3 MMOL/L (ref 0.4–2)
LACTIC ACID: 0.5 MMOL/L (ref 0.4–2)
LEUKOCYTE ESTERASE, URINE: NEGATIVE
LIPASE: 10 U/L (ref 13–60)
LYMPHOCYTES ABSOLUTE: 1.4 K/UL (ref 1–5.1)
LYMPHOCYTES RELATIVE PERCENT: 15.1 %
Lab: NORMAL
MCH RBC QN AUTO: 33.9 PG (ref 26–34)
MCHC RBC AUTO-ENTMCNC: 33.7 G/DL (ref 31–36)
MCV RBC AUTO: 100.6 FL (ref 80–100)
METHADONE SCREEN, URINE: NORMAL
MICROSCOPIC EXAMINATION: YES
MONOCYTES ABSOLUTE: 0.6 K/UL (ref 0–1.3)
MONOCYTES RELATIVE PERCENT: 7 %
NEUTROPHILS ABSOLUTE: 6.4 K/UL (ref 1.7–7.7)
NEUTROPHILS RELATIVE PERCENT: 71.9 %
NITRITE, URINE: NEGATIVE
OCCULT BLOOD DIAGNOSTIC: NORMAL
OPIATE SCREEN URINE: NORMAL
OXYCODONE URINE: NORMAL
PDW BLD-RTO: 13.2 % (ref 12.4–15.4)
PH UA: 6
PH UA: 6 (ref 5–8)
PHENCYCLIDINE SCREEN URINE: NORMAL
PLATELET # BLD: 191 K/UL (ref 135–450)
PMV BLD AUTO: 8 FL (ref 5–10.5)
POTASSIUM REFLEX MAGNESIUM: 4.6 MMOL/L (ref 3.5–5.1)
PRO-BNP: 324 PG/ML (ref 0–124)
PROCALCITONIN: 0.08 NG/ML (ref 0–0.15)
PROPOXYPHENE SCREEN: NORMAL
PROTEIN UA: NEGATIVE MG/DL
RBC # BLD: 2.44 M/UL (ref 4–5.2)
RBC UA: ABNORMAL /HPF (ref 0–4)
SODIUM BLD-SCNC: 135 MMOL/L (ref 136–145)
SPECIFIC GRAVITY UA: 1.01 (ref 1–1.03)
TOTAL CK: 70 U/L (ref 26–192)
TOTAL PROTEIN: 5.8 G/DL (ref 6.4–8.2)
TROPONIN: <0.01 NG/ML
TSH REFLEX: 0.27 UIU/ML (ref 0.27–4.2)
URINE REFLEX TO CULTURE: ABNORMAL
URINE TYPE: ABNORMAL
UROBILINOGEN, URINE: 0.2 E.U./DL
WBC # BLD: 8.9 K/UL (ref 4–11)
WBC UA: ABNORMAL /HPF (ref 0–5)

## 2021-01-11 PROCEDURE — G0328 FECAL BLOOD SCRN IMMUNOASSAY: HCPCS

## 2021-01-11 PROCEDURE — 84145 PROCALCITONIN (PCT): CPT

## 2021-01-11 PROCEDURE — 6370000000 HC RX 637 (ALT 250 FOR IP): Performed by: NURSE PRACTITIONER

## 2021-01-11 PROCEDURE — 02HV33Z INSERTION OF INFUSION DEVICE INTO SUPERIOR VENA CAVA, PERCUTANEOUS APPROACH: ICD-10-PCS | Performed by: EMERGENCY MEDICINE

## 2021-01-11 PROCEDURE — 71045 X-RAY EXAM CHEST 1 VIEW: CPT

## 2021-01-11 PROCEDURE — U0003 INFECTIOUS AGENT DETECTION BY NUCLEIC ACID (DNA OR RNA); SEVERE ACUTE RESPIRATORY SYNDROME CORONAVIRUS 2 (SARS-COV-2) (CORONAVIRUS DISEASE [COVID-19]), AMPLIFIED PROBE TECHNIQUE, MAKING USE OF HIGH THROUGHPUT TECHNOLOGIES AS DESCRIBED BY CMS-2020-01-R: HCPCS

## 2021-01-11 PROCEDURE — 6370000000 HC RX 637 (ALT 250 FOR IP): Performed by: EMERGENCY MEDICINE

## 2021-01-11 PROCEDURE — 2580000003 HC RX 258: Performed by: NURSE PRACTITIONER

## 2021-01-11 PROCEDURE — 93010 ELECTROCARDIOGRAM REPORT: CPT | Performed by: INTERNAL MEDICINE

## 2021-01-11 PROCEDURE — 87040 BLOOD CULTURE FOR BACTERIA: CPT

## 2021-01-11 PROCEDURE — 99291 CRITICAL CARE FIRST HOUR: CPT | Performed by: INTERNAL MEDICINE

## 2021-01-11 PROCEDURE — 6360000002 HC RX W HCPCS: Performed by: EMERGENCY MEDICINE

## 2021-01-11 PROCEDURE — 99285 EMERGENCY DEPT VISIT HI MDM: CPT

## 2021-01-11 PROCEDURE — 83880 ASSAY OF NATRIURETIC PEPTIDE: CPT

## 2021-01-11 PROCEDURE — 93005 ELECTROCARDIOGRAM TRACING: CPT | Performed by: PHYSICIAN ASSISTANT

## 2021-01-11 PROCEDURE — 96375 TX/PRO/DX INJ NEW DRUG ADDON: CPT

## 2021-01-11 PROCEDURE — 2580000003 HC RX 258: Performed by: PHYSICIAN ASSISTANT

## 2021-01-11 PROCEDURE — 80307 DRUG TEST PRSMV CHEM ANLYZR: CPT

## 2021-01-11 PROCEDURE — 84484 ASSAY OF TROPONIN QUANT: CPT

## 2021-01-11 PROCEDURE — 83605 ASSAY OF LACTIC ACID: CPT

## 2021-01-11 PROCEDURE — 1200000000 HC SEMI PRIVATE

## 2021-01-11 PROCEDURE — 71260 CT THORAX DX C+: CPT

## 2021-01-11 PROCEDURE — 2580000003 HC RX 258: Performed by: EMERGENCY MEDICINE

## 2021-01-11 PROCEDURE — 84443 ASSAY THYROID STIM HORMONE: CPT

## 2021-01-11 PROCEDURE — 80053 COMPREHEN METABOLIC PANEL: CPT

## 2021-01-11 PROCEDURE — 6360000004 HC RX CONTRAST MEDICATION: Performed by: PHYSICIAN ASSISTANT

## 2021-01-11 PROCEDURE — 83690 ASSAY OF LIPASE: CPT

## 2021-01-11 PROCEDURE — 81001 URINALYSIS AUTO W/SCOPE: CPT

## 2021-01-11 PROCEDURE — 6360000002 HC RX W HCPCS: Performed by: PHYSICIAN ASSISTANT

## 2021-01-11 PROCEDURE — 6360000002 HC RX W HCPCS: Performed by: NURSE PRACTITIONER

## 2021-01-11 PROCEDURE — 82550 ASSAY OF CK (CPK): CPT

## 2021-01-11 PROCEDURE — 85025 COMPLETE CBC W/AUTO DIFF WBC: CPT

## 2021-01-11 PROCEDURE — 96365 THER/PROPH/DIAG IV INF INIT: CPT

## 2021-01-11 RX ORDER — LORATADINE 10 MG/1
10 CAPSULE, LIQUID FILLED ORAL DAILY
COMMUNITY

## 2021-01-11 RX ORDER — ACETAMINOPHEN 325 MG/1
325 TABLET ORAL ONCE
Status: DISCONTINUED | OUTPATIENT
Start: 2021-01-11 | End: 2021-01-11

## 2021-01-11 RX ORDER — SODIUM CHLORIDE 0.9 % (FLUSH) 0.9 %
10 SYRINGE (ML) INJECTION EVERY 12 HOURS SCHEDULED
Status: DISCONTINUED | OUTPATIENT
Start: 2021-01-11 | End: 2021-01-12 | Stop reason: HOSPADM

## 2021-01-11 RX ORDER — ONDANSETRON 2 MG/ML
4 INJECTION INTRAMUSCULAR; INTRAVENOUS EVERY 6 HOURS PRN
Status: DISCONTINUED | OUTPATIENT
Start: 2021-01-11 | End: 2021-01-12 | Stop reason: HOSPADM

## 2021-01-11 RX ORDER — NAPROXEN 500 MG/1
500 TABLET ORAL 2 TIMES DAILY PRN
COMMUNITY

## 2021-01-11 RX ORDER — PROMETHAZINE HYDROCHLORIDE 25 MG/1
12.5 TABLET ORAL EVERY 6 HOURS PRN
Status: DISCONTINUED | OUTPATIENT
Start: 2021-01-11 | End: 2021-01-12 | Stop reason: HOSPADM

## 2021-01-11 RX ORDER — OMEPRAZOLE 20 MG/1
20 CAPSULE, DELAYED RELEASE ORAL DAILY
COMMUNITY

## 2021-01-11 RX ORDER — GABAPENTIN 400 MG/1
400 CAPSULE ORAL 3 TIMES DAILY
COMMUNITY

## 2021-01-11 RX ORDER — LEVOTHYROXINE SODIUM 175 UG/1
175 TABLET ORAL DAILY
COMMUNITY

## 2021-01-11 RX ORDER — BUPRENORPHINE HYDROCHLORIDE 8 MG/1
8 TABLET SUBLINGUAL DAILY
COMMUNITY

## 2021-01-11 RX ORDER — 0.9 % SODIUM CHLORIDE 0.9 %
1000 INTRAVENOUS SOLUTION INTRAVENOUS ONCE
Status: COMPLETED | OUTPATIENT
Start: 2021-01-11 | End: 2021-01-11

## 2021-01-11 RX ORDER — DOXYCYCLINE HYCLATE 100 MG/1
100 CAPSULE ORAL 2 TIMES DAILY
COMMUNITY

## 2021-01-11 RX ORDER — SODIUM CHLORIDE 0.9 % (FLUSH) 0.9 %
10 SYRINGE (ML) INJECTION PRN
Status: DISCONTINUED | OUTPATIENT
Start: 2021-01-11 | End: 2021-01-12 | Stop reason: HOSPADM

## 2021-01-11 RX ORDER — LISINOPRIL 5 MG/1
5 TABLET ORAL DAILY
COMMUNITY

## 2021-01-11 RX ORDER — SODIUM CHLORIDE 9 MG/ML
INJECTION, SOLUTION INTRAVENOUS CONTINUOUS
Status: DISCONTINUED | OUTPATIENT
Start: 2021-01-11 | End: 2021-01-12 | Stop reason: HOSPADM

## 2021-01-11 RX ORDER — ACETAMINOPHEN 325 MG/1
650 TABLET ORAL EVERY 6 HOURS PRN
Status: DISCONTINUED | OUTPATIENT
Start: 2021-01-11 | End: 2021-01-12 | Stop reason: HOSPADM

## 2021-01-11 RX ORDER — NITROFURANTOIN 25; 75 MG/1; MG/1
100 CAPSULE ORAL 2 TIMES DAILY
COMMUNITY

## 2021-01-11 RX ORDER — PRAMIPEXOLE DIHYDROCHLORIDE 0.25 MG/1
0.25 TABLET ORAL 3 TIMES DAILY
COMMUNITY

## 2021-01-11 RX ORDER — ACETAMINOPHEN 325 MG/1
650 TABLET ORAL ONCE
Status: COMPLETED | OUTPATIENT
Start: 2021-01-11 | End: 2021-01-11

## 2021-01-11 RX ORDER — ATORVASTATIN CALCIUM 20 MG/1
20 TABLET, FILM COATED ORAL DAILY
COMMUNITY

## 2021-01-11 RX ORDER — ACETAMINOPHEN 650 MG/1
650 SUPPOSITORY RECTAL EVERY 6 HOURS PRN
Status: DISCONTINUED | OUTPATIENT
Start: 2021-01-11 | End: 2021-01-12 | Stop reason: HOSPADM

## 2021-01-11 RX ORDER — FERROUS SULFATE 325(65) MG
325 TABLET ORAL
COMMUNITY

## 2021-01-11 RX ORDER — DULOXETIN HYDROCHLORIDE 30 MG/1
30 CAPSULE, DELAYED RELEASE ORAL DAILY
COMMUNITY

## 2021-01-11 RX ORDER — POLYETHYLENE GLYCOL 3350 17 G/17G
17 POWDER, FOR SOLUTION ORAL DAILY PRN
Status: DISCONTINUED | OUTPATIENT
Start: 2021-01-11 | End: 2021-01-12 | Stop reason: HOSPADM

## 2021-01-11 RX ORDER — ONDANSETRON 2 MG/ML
4 INJECTION INTRAMUSCULAR; INTRAVENOUS ONCE
Status: COMPLETED | OUTPATIENT
Start: 2021-01-11 | End: 2021-01-11

## 2021-01-11 RX ADMIN — DEXTROSE MONOHYDRATE 500 MG: 50 INJECTION, SOLUTION INTRAVENOUS at 16:15

## 2021-01-11 RX ADMIN — ENOXAPARIN SODIUM 40 MG: 40 INJECTION SUBCUTANEOUS at 21:00

## 2021-01-11 RX ADMIN — IOPAMIDOL 75 ML: 755 INJECTION, SOLUTION INTRAVENOUS at 14:24

## 2021-01-11 RX ADMIN — SODIUM CHLORIDE 1000 ML: 9 INJECTION, SOLUTION INTRAVENOUS at 13:01

## 2021-01-11 RX ADMIN — SODIUM CHLORIDE 1000 ML: 9 INJECTION, SOLUTION INTRAVENOUS at 16:15

## 2021-01-11 RX ADMIN — PROMETHAZINE HYDROCHLORIDE 12.5 MG: 25 TABLET ORAL at 21:06

## 2021-01-11 RX ADMIN — ONDANSETRON HYDROCHLORIDE 4 MG: 2 INJECTION, SOLUTION INTRAMUSCULAR; INTRAVENOUS at 11:24

## 2021-01-11 RX ADMIN — SODIUM CHLORIDE 1000 ML: 9 INJECTION, SOLUTION INTRAVENOUS at 11:16

## 2021-01-11 RX ADMIN — SODIUM CHLORIDE 125 ML: 9 INJECTION, SOLUTION INTRAVENOUS at 20:59

## 2021-01-11 RX ADMIN — ACETAMINOPHEN 650 MG: 325 TABLET ORAL at 13:06

## 2021-01-11 RX ADMIN — CEFTRIAXONE SODIUM 1 G: 1 INJECTION, POWDER, FOR SOLUTION INTRAMUSCULAR; INTRAVENOUS at 15:18

## 2021-01-11 ASSESSMENT — ENCOUNTER SYMPTOMS
SHORTNESS OF BREATH: 0
VOMITING: 0
DIARRHEA: 0
BACK PAIN: 0
ABDOMINAL PAIN: 1
COUGH: 0
NAUSEA: 0
EYE PAIN: 0

## 2021-01-11 ASSESSMENT — PAIN SCALES - GENERAL: PAINLEVEL_OUTOF10: 4

## 2021-01-11 ASSESSMENT — PAIN DESCRIPTION - DESCRIPTORS: DESCRIPTORS: STABBING;ACHING

## 2021-01-11 NOTE — ED PROVIDER NOTES
[address] ED  EMERGENCY DEPARTMENT ENCOUNTER        Pt Name: Kalyn Jackson  MRN: [id number]  [place] 1970  Date of evaluation: 1/11/2021  Provider: Sarajane Aschoff, PA  PCP: No primary care provider on file. I have seen and evaluated this patient with my supervising physician Kiarra Sargent, [address] [facility],[address]       Chief Complaint   Patient presents with    Shortness of Breath     started friday    Fatigue     who body achy. feels tingly after pee feels like still has to pee    Fall     legs gave out saturday    Abdominal Pain       HISTORY OF PRESENT ILLNESS   (Location, Timing/Onset, Context/Setting, Quality, Duration, Modifying Factors, Severity, Associated Signs and Symptoms)  Note limiting factors. Kalyn Jackson is a 48 y.o. female with past medical history of chronic pancreatitis, COPD, anemia, previous IV drug user (with recent stay in recovery center for IV fentanyl use), who presents to the emergency department for multiple complaints including generalized body pain, chest pain, abdominal pain, cough, shortness of breath, wheezing. Patient states that her symptoms began 3 days ago. Began with her entire body hurting even to light touch anywhere. She reports he started to have severe mid sternal chest pain and left arm pain. The following day she developed epigastric abdominal pain that she thinks may feel similar to previous pancreatitis. She rates her generalized body pain, chest pain and upper abdominal pain as a 10 out of 10, described as severe. She also developed a cough that is productive of green sputum and she reports associated shortness of breath and wheezing. She states that later in the day, 2 days ago, after urinating she was walking out of the bathroom and her legs just gave out from underneath her secondary to weakness. She denies lightheadedness or dizziness or syncope. She denies head injury.   She hit her left knee and the right upper arm, has been able to ambulate and has full flexion of the left knee. She has a small bruise on the right posterior upper arm. She arrives with a blood pressure of 83/53, received 750 mL bolus en route from EMS. I Reviewed prior records. Patient was most recently seen on 12/28 in this emergency department for generalized body aches, COPD exacerbation. Covid swab was negative at that time. She was also hypotensive and apparently hypoxic during her ED stay, CT was negative for PE. She improved with fluids, steroids, breathing treatments. At that time she also had generalized body pain but there were no complaints of chest pain. She was given multiple doses of IV pain medications. Nursing Notes were all reviewed and agreed with or any disagreements were addressed in the HPI. REVIEW OF SYSTEMS    (2-9 systems for level 4, 10 or more for level 5)     Review of Systems   Constitutional: Positive for fatigue. Negative for chills and fever. Eyes: Negative for pain. Respiratory: Negative for cough and shortness of breath. Cardiovascular: Positive for chest pain. Gastrointestinal: Positive for abdominal pain. Negative for diarrhea, nausea and vomiting. Genitourinary: Negative for dysuria. Musculoskeletal: Positive for myalgias. Negative for back pain, neck pain and neck stiffness. Skin: Negative for rash. Neurological: Positive for weakness. Negative for dizziness and headaches. Psychiatric/Behavioral: Negative for confusion. Positives and Pertinent negatives as per HPI. Except as noted above in the ROS, all other systems were reviewed and negative.        PAST MEDICAL HISTORY     Past Medical History:   Diagnosis Date    Chronic pancreatitis ([address]     COPD (chronic obstructive pulmonary disease) ([facility] [address]          SURGICAL HISTORY     Past Surgical History:   Procedure Laterality Date    CHOLECYSTECTOMY      HYSTERECTOMY      THYROIDECTOMY           CURRENTMEDICATIONS Previous Medications    ALBUTEROL SULFATE HFA (PROVENTIL HFA) 108 (90 BASE) MCG/ACT INHALER    Inhale 2 puffs into the lungs every 4 hours as needed for Wheezing         ALLERGIES     Aspirin, Toradol [ketorolac tromethamine], Tramadol, and Vicodin [hydrocodone-acetaminophen]    FAMILYHISTORY     History reviewed. No pertinent family history. SOCIAL HISTORY       Social History     Tobacco Use    Smoking status: Current Every Day Smoker   Substance Use Topics    Alcohol use: Never     Frequency: Never    Drug use: Not Currently       SCREENINGS    [place] Coma Scale  Eye Opening: Spontaneous  Best Verbal Response: Oriented  Best Motor Response: Obeys commands  [place] Coma Scale Score: 15        PHYSICAL EXAM    (up to 7 for level 4, 8 or more for level 5)     ED Triage Vitals   BP Temp Temp Source Pulse Resp SpO2 Height Weight   01/11/21 1035 01/11/21 1035 01/11/21 1035 01/11/21 1035 01/11/21 1035 01/11/21 1035 01/11/21 1029 01/11/21 1029   (!) 83/53 98.3 °F (36.8 °C) Oral 84 20 95 % 5' 8\" (1.727 m) 160 lb (72.6 kg)       Physical Exam  Vitals signs and nursing note reviewed. Constitutional:       General: She is not in acute distress. Appearance: She is well-developed. She is ill-appearing. She is not toxic-appearing or diaphoretic. HENT:      Head: Normocephalic and atraumatic. Eyes:      General:         Right eye: No discharge. Left eye: No discharge. Neck:      Musculoskeletal: Normal range of motion and neck supple. Pulmonary:      Effort: Pulmonary effort is normal. No tachypnea, accessory muscle usage or respiratory distress. Breath sounds: No stridor. Chest:      Chest wall: Tenderness (generalized) present. Abdominal:      Tenderness: There is abdominal tenderness (generalized). Genitourinary:     Rectum: Guaiac result negative. Musculoskeletal: Normal range of motion. Left knee: Normal.   Skin:     General: Skin is warm and dry.       Coloration: Skin [employer] [employer]   Phone [phone]   BRAIN NATRIURETIC PEPTIDE - Abnormal; Notable for the following components:    Pro- (*)     All other components within normal limits    Narrative:     Performed at:  [facility],  [place], [employer]   Phone [phone]   MICROSCOPIC URINALYSIS - Abnormal; Notable for the following components:    Epithelial Cells, UA 11-20 (*)     All other components within normal limits    Narrative:     Performed at:  [facility],  [place], [employer]   Phone [phone]   CULTURE, BLOOD 1   CULTURE, BLOOD 2   TROPONIN    Narrative:     Performed at:  [place] [address],  [employer], [employer]   Phone [phone]   TSH WITH REFLEX    Narrative:     Performed at:  [facility],  [employer], [employer]   Phone [phone]   LACTIC ACID, PLASMA    Narrative:     Performed at:  [place] [address],  [place], [employer]   Phone [phone]   URINE DRUG SCREEN    Narrative:     Performed at:  [place] [address],  [employer], [employer]   Phone [phone]   CK    Narrative:     Performed at:  [facility],  [place], [employer]   Phone [phone]   BLOOD OCCULT STOOL DIAGNOSTIC    Narrative:     ORDER#: [id number]                          ORDERED BY: Nevin Connelly  SOURCE: Stool                              COLLECTED:  01/11/21 13:15  ANTIBIOTICS AT SEFERINO.:                      RECEIVED :  01/11/21 13:29  Performed at:  [facility],  [place], [employer]   Phone [phone]   HEMOGLOBIN AND HEMATOCRIT, BLOOD       All other labs were within normal range or not returned as of this dictation. EKG: All EKG's are interpreted by the Emergency Department Physician in the absence of a cardiologist.  Please see their note for interpretation of EKG. RADIOLOGY:   Non-plain film images such as CT, Ultrasound and MRI are read by the radiologist. Plain radiographic images are visualized and preliminarily interpreted by the ED Provider with the below findings:        Interpretation per the Radiologist below, if available at the time of this note:    XR CHEST PORTABLE   Final Result   Right jugular central line projects in normal position. No pneumothorax. Focal airspace in the right upper lobe, most likely pneumonia. Surveillance   until resolution is recommended. CT CHEST ABDOMEN PELVIS W CONTRAST   Final Result   1. Small focus of airspace disease in the right upper lobe is consistent with   focal pneumonia. 2. No acute intra-abdominal abnormality. XR CHEST PORTABLE   Final Result   No evidence of acute cardiopulmonary disease. No results found.         PROCEDURES   Unless otherwise noted below, none     Procedures    CRITICAL CARE TIME   N/A    CONSULTS:  IP CONSULT TO HOSPITALIST  IP CONSULT TO PULMONOLOGY      EMERGENCY DEPARTMENT COURSE and DIFFERENTIAL DIAGNOSIS/MDM:   Vitals:    Vitals:    01/11/21 1506 01/11/21 1509 01/11/21 1523 01/11/21 1605   BP: (!) 75/49 (!) 71/43 86/72 (!) 87/56   Pulse: 74 75  75   Resp: 10 11  13   Temp:       TempSrc:       SpO2: 93% 90%  97%   Weight:       Height:           Patient was given the following medications:  Medications   cefTRIAXone (ROCEPHIN) 1 g IVPB in 50 mL D5W minibag (1 g Intravenous New Bag 1/11/21 1518)   azithromycin (ZITHROMAX) 500 mg in D5W 250ml addavial (500 mg Intravenous New Bag 1/11/21 1615)   0.9 % sodium chloride bolus (1,000 mLs Intravenous New Bag 1/11/21 1615)   0.9 % sodium chloride bolus (0 mLs Intravenous Stopped 1/11/21 1304)   ondansetron (ZOFRAN) injection 4 mg (4 mg Intravenous Given 1/11/21 1124)   0.9 % sodium chloride bolus (0 mLs Intravenous Stopped 1/11/21 1412)   acetaminophen (TYLENOL) tablet 650 mg (650 mg Oral Given 1/11/21 1306)   iopamidol (ISOVUE-370) 76 % injection 75 mL (75 mLs Intravenous Given 1/11/21 1424)           ED COURSE & MEDICAL DECISION MAKING    Pertinent Labs & Imaging studies reviewed. (See chart for details)   -  Patient seen and evaluated in the emergency department. Patient hypotensive systolic 83, otherwise vital signs normal.  She is stable in no acute distress, does appear ill. Ordered 1 L fluids.  -  Triage and nursing notes reviewed and incorporated. -  Old chart records reviewed and incorporated. -  Patient case discussed with attending physician, Dr Jim Jacinto. They saw and examined patient. -  DDX: SRIKANTH, COVID 19, SEPSIS , ACUTE CORONARY SYNDROME, MALIGNANT DYSRHYTHMIA, MENINGITIS, PNEUMONIA, PULMONARY EMBOLISM, SEPSIS,  URINARY TRACT INFECTION  -work up with CBC, metabolic panel, troponin, BN P was negative for acute abnormalities. Urine does not show signs of infection. Covid is pending. Lactic was negative x2. Hemoccult was negative. CK normal.  - EKG without acute ischemic findings. -CT of the chest abdomen pelvis shows right upper lobe pneumonia, no other acute intra-abdominal abnormality. Patient receiving total of 2 L of IV fluids in the emergency department, Rocephin and Zithromax to cover for pneumonia. Main stable at multiple reevaluations. -My attending Dr. Anabell Jones placed a central line. Blood pressure remains stable at 87/56. Receiving third liter of IV fluids.     Because of high probability of sudden clinical deterioration of the patient's condition or  further deterioration, critical care time included my full attention to the patient's condition, including chart data review, documentation, medication ordering, reviewing the patient's old records, reevaluation patient's cardiac, pulmonary and neurological status. Reassessment of vital signs. Consultations with ED attending and admitting physician. Ordering, interpreting reviewing diagnostic testing. Therefore, my critical care time was 30 minutes of direct attention to the patient's condition did not include time spent on procedures.     Results for orders placed or performed during the hospital encounter of 01/11/21   CBC Auto Differential   Result Value Ref Range    WBC 8.9 4.0 - 11.0 K/uL    RBC 2.44 (L) 4.00 - 5.20 M/uL    Hemoglobin 8.3 (L) 12.0 - 16.0 g/dL    Hematocrit 24.5 (L) 36.0 - 48.0 %    .6 (H) 80.0 - 100.0 fL    MCH 33.9 26.0 - 34.0 pg    MCHC 33.7 31.0 - 36.0 g/dL    RDW 13.2 12.4 - 15.4 %    Platelets 363 136 - 725 K/uL    MPV 8.0 5.0 - 10.5 fL    Neutrophils % 71.9 %    Lymphocytes % 15.1 %    Monocytes % 7.0 %    Eosinophils % 5.3 %    Basophils % 0.7 %    Neutrophils Absolute 6.4 1.7 - 7.7 K/uL    Lymphocytes Absolute 1.4 1.0 - 5.1 K/uL    Monocytes Absolute 0.6 0.0 - 1.3 K/uL    Eosinophils Absolute 0.5 0.0 - 0.6 K/uL    Basophils Absolute 0.1 0.0 - 0.2 K/uL   Comprehensive Metabolic Panel w/ Reflex to MG   Result Value Ref Range    Sodium 135 (L) 136 - 145 mmol/L    Potassium reflex Magnesium 4.6 3.5 - 5.1 mmol/L    Chloride 101 99 - 110 mmol/L    CO2 26 21 - 32 mmol/L    Anion Gap 8 3 - 16    Glucose 96 70 - 99 mg/dL    BUN 32 (H) 7 - 20 mg/dL    CREATININE 1.4 (H) 0.6 - 1.1 mg/dL    GFR Non- 40 (A) >60    GFR  48 (A) >60    Calcium 8.9 8.3 - 10.6 mg/dL    Total Protein 5.8 (L) 6.4 - 8.2 g/dL    Alb 3.0 (L) 3.4 - 5.0 g/dL    Albumin/Globulin Ratio 1.1 1.1 - 2.2    Total Bilirubin <0.2 0.0 - 1.0 mg/dL    Alkaline Phosphatase 73 40 - 129 U/L    ALT 8 (L) 10 - 40 U/L    AST 9 (L) 15 - 37 U/L    Globulin 2.8 g/dL   Lipase   Result Value Ref Range    Lipase 10.0 (L) 13.0 - 60.0 U/L   Troponin   Result Value Ref Range    Troponin <0.01 <0.01 ng/mL   Urinalysis Reflex to Culture    Specimen: Urine, clean catch Result Value Ref Range    Color, UA Yellow Straw/Yellow    Clarity, UA Clear Clear    Glucose, Ur Negative Negative mg/dL    Bilirubin Urine Negative Negative    Ketones, Urine Negative Negative mg/dL    Specific Gravity, UA 1.010 1.005 - 1.030    Blood, Urine TRACE-INTACT (A) Negative    pH, UA 6.0 5.0 - 8.0    Protein, UA Negative Negative mg/dL    Urobilinogen, Urine 0.2 <2.0 E.U./dL    Nitrite, Urine Negative Negative    Leukocyte Esterase, Urine Negative Negative    Microscopic Examination YES     Urine Type NotGiven     Urine Reflex to Culture Not Indicated    TSH with Reflex   Result Value Ref Range    TSH 0.27 0.27 - 4.20 uIU/mL   Lactic Acid, Plasma   Result Value Ref Range    Lactic Acid 0.5 0.4 - 2.0 mmol/L   Lactic Acid, Plasma   Result Value Ref Range    Lactic Acid 0.3 (L) 0.4 - 2.0 mmol/L   Brain Natriuretic Peptide   Result Value Ref Range    Pro- (H) 0 - 124 pg/mL   Drug screen multi urine   Result Value Ref Range    Amphetamine Screen, Urine Neg Negative <1000ng/mL    Barbiturate Screen, Ur Neg Negative <200 ng/mL    Benzodiazepine Screen, Urine Neg Negative <200 ng/mL    Cannabinoid Scrn, Ur Neg Negative <50 ng/mL    Cocaine Metabolite Screen, Urine Neg Negative <300 ng/mL    Opiate Scrn, Ur Neg Negative <300 ng/mL    PCP Screen, Urine Neg Negative <25 ng/mL    Methadone Screen, Urine Neg Negative <300 ng/mL    Propoxyphene Scrn, Ur Neg Negative <300 ng/mL    Oxycodone Urine Neg Negative <100 ng/ml    pH, UA 6.0     Drug Screen Comment: see below    CK   Result Value Ref Range    Total CK 70 26 - 192 U/L   Microscopic Urinalysis   Result Value Ref Range    WBC, UA None seen 0 - 5 /HPF    RBC, UA 3-4 0 - 4 /HPF    Epithelial Cells, UA 11-20 (A) 0 - 5 /HPF   Blood occult stool #1   Result Value Ref Range    Occult Blood Diagnostic Result: Negative  Normal range: Negative      EKG 12 Lead   Result Value Ref Range    Ventricular Rate 77 BPM    Atrial Rate 77 BPM    P-R Interval 154 ms    QRS Duration 80 ms    Q-T Interval 380 ms    QTc Calculation (Bazett) 430 ms    P Axis 68 degrees    R Axis 69 degrees    T Axis 54 degrees    Diagnosis       Normal sinus rhythmEarly repolarizationNormal ECGNo previous ECGs available       I spoke with YASMIN Huang with admitting physician. We thoroughly discussed the history, physical exam, laboratory and imaging studies, as well as, emergency department course. Based upon that discussion, we've decided to admit Kalyn Jackson for further observation and evaluation of Ivonne Caldwell's dyspnea. As I have deemed necessary from their history, physical, and studies, I have considered and evaluated Kalyn Jackson for the following diagnoses:  ACUTE CORONARY SYNDROME, CHRONIC OBSTRUCTIVE PULMONARY DISEASE, CONGESTIVE HEART FAILURE, PERICARDIAL TAMPONADE, PNEUMONIA, PNEUMOTHORAX, PULMONARY EMBOLISM, SEPSIS, and THORACIC DISSECTION. FINAL IMPRESSION      1. Pneumonia of right upper lobe due to infectious organism    2. Hypotension, unspecified hypotension type          DISPOSITION/PLAN   DISPOSITION Admitted 01/11/2021 03:56:05 PM      PATIENT REFERREDTO:  No follow-up provider specified.     DISCHARGE MEDICATIONS:  New Prescriptions    No medications on file       DISCONTINUED MEDICATIONS:  Discontinued Medications    No medications on file              (Please note that portions of this note were completed with a voice recognition program.  Efforts were made to edit the dictations but occasionally words are mis-transcribed.)    Sarajane Aschoff, PA (electronically signed)            Sarajane Aschoff, PA  01/11/21 David Hartmann, [id number]  01/11/21 9828

## 2021-01-11 NOTE — CONSULTS
Reason for referral and CC: Hypotension    HISTORY OF PRESENT ILLNESS: 3year-old female with history of COPD and chronic pancreatitis and heroin abuse arrives from her inpatient drug rehab center with complaint of weakness and diffuse whole body aches. Does have shortness of breath. Nonproductive cough. No fever. No change in taste or smell. Past Medical History:   Diagnosis Date    Chronic pancreatitis ([address]     COPD (chronic obstructive pulmonary disease) ([facility])      Past Surgical History:   Procedure Laterality Date    CHOLECYSTECTOMY      HYSTERECTOMY      THYROIDECTOMY       Family History  family history is not on file. Social History:  reports that she has been smoking. She does not have any smokeless tobacco history on file. reports no history of alcohol use. ALLERGIES:  Patient is allergic to aspirin; toradol [ketorolac tromethamine]; tramadol; and vicodin [hydrocodone-acetaminophen]. Continuous Infusions:    Scheduled Meds:   cefTRIAXone (ROCEPHIN) IV  1 g Intravenous Q24H    azithromycin  500 mg Intravenous Once    sodium chloride  1,000 mL Intravenous Once     PRN Meds:      REVIEW OF SYSTEMS:  Constitutional: Negative for fever  HENT: Negative for sore throat  Eyes: Negative for redness   Respiratory: Negative for dyspnea, cough  Cardiovascular: Negative for chest pain  Gastrointestinal: Negative for vomiting, diarrhea   Genitourinary: Negative for hematuria   Musculoskeletal: Negative for arthralgias   Skin: Negative for rash  Neurological: Negative for syncope  Hematological: Negative for adenopathy  Psychiatric/Behavorial: Negative for anxiety    PHYSICAL EXAM: BP (!) 73/43   Pulse 73   Temp 98.3 °F (36.8 °C) (Oral)   Resp 12   Ht 5' 8\" (1.727 m)   Wt 160 lb (72.6 kg)   SpO2 (!) 89%   BMI 24.33 kg/m²  on ra  Constitutional:  No acute distress. Eyes: PERRL. Conjunctivae anicteric. ENT: Normal nose. Normal tongue. Neck:  Trachea is midline.  No thyroid tenderness. Respiratory: No accessory muscle usage. no decreased breath sounds. No wheezes. No rales. No Rhonchi. Cardiovascular: Normal S1S2. No digit clubbing. No digit cyanosis. No LE edema. Capillary refill is normal.   Gastrointestinal: No mass palpated. No tenderness palpated. No umbilical hernia. Lymphatic: No cervical or supraclavicular adenopathy. Skin: No rash on the exposed extremities. No Nodules or induration on exposed extremities. Psychiatric: No anxiety or Agitation. Alert and Oriented to person, place and time. CBC:   Recent Labs     01/11/21  1125   WBC 8.9   HGB 8.3*   HCT 24.5*   .6*        BMP:   Recent Labs     01/11/21  1125   *   K 4.6      CO2 26   BUN 32*   CREATININE 1.4*        Recent Labs     01/11/21  1125   AST 9*   ALT 8*   LIPASE 10.0*   BILITOT <0.2   ALKPHOS 73     No results for input(s): PROTIME, INR in the last 72 hours. Recent Labs     01/11/21  1200   COLORU Yellow   PHUR 6.0  6.0   WBCUA None seen   RBCUA 3-4   CLARITYU Clear   SPECGRAV 1.010   LEUKOCYTESUR Negative   UROBILINOGEN 0.2   BILIRUBINUR Negative   BLOODU TRACE-INTACT*   GLUCOSEU Negative     No results for input(s): PHART, HNW9MSN, PO2ART in the last 72 hours. Chest imaging was reviewed by me and showed 1/11/21 Chest CT:   1. Small focus of airspace disease in the right upper lobe is consistent with   focal pneumonia. 2. No acute intra-abdominal abnormality. ASSESSMENT:  · Hypotension: the etiology is unclear, her pro calcitonin is normal but sepsis is suspected;  the small focus of pneumonia would not seem to explain the severity of her symptoms.   · Pulmonary emphysema on Chest Ct  · Tobacco abuse  · SRIKANTH  · Anemia  · Past Heroin abuse on Suboxone    PLAN:  · Vasopressors to mainatin MAP > 65  · Lactate WNL  · Check PCT  · IVF  · Blood cx  · URINE and resp cx  · CTX and azithromycin  · Okay to resume Suboxone  · Bronchodilators as needed  · Lovenox DVT prophylaxis  · CCT 32 min    Thank you Nolan Angulo MD for this consult

## 2021-01-11 NOTE — ED PROVIDER NOTES
I independently performed a history and physical on UCHE Connell. All diagnostic, treatment, and disposition decisions were made by myself in conjunction with the advanced practice provider. I have participated in the medical decision making and directed the treatment plan and disposition of the patient. For further details of [facility] emergency department encounter, please see the advanced practice provider's documentation. CHIEF COMPLAINT  Chief Complaint   Patient presents with    Shortness of Breath     started friday    Fatigue     who body achy. feels tingly after pee feels like still has to pee    Fall     legs gave out saturday    Abdominal Pain       Briefly, UCEH Connell is a 48 y.o. female  who presents to the ED complaining of shortness of breath fatigue after a fall with abdominal discomfort shortness of breath and cough    FOCUSED PHYSICAL EXAMINATION  /73   Pulse 78   Temp 96.9 °F (36.1 °C) (Oral)   Resp 16   Ht 5' 8\" (1.727 m)   Wt 160 lb (72.6 kg)   SpO2 95%   BMI 24.33 kg/m²      Focused physical examination:  General appearance:  Cooperative. Ill-appearing  Skin:  Warm. Dry. Eye:  Extraocular movements intact. Ears, nose, mouth and throat:  Oral mucosa moist,  Neck:  Trachea midline. Heart:  Regular rate and rhythm  Perfusion:  intact  Respiratory:  Lungs clear to auscultation bilaterally. Respirations nonlabored. Abdominal:   Non distended. Nontender  Neurological:  Alert and oriented x 3. Moves all extremities spontaneously  Musculoskeletal:   Normal ROM, no deformities          Psychiatric:  Normal mood      EKG: Sinus rhythm early repolarization rate of 77 bpm.    MDM: Patient presented the emergency department today complaining of generalized myalgias, fatigue, weakness and cough with shortness of breath. Work-up notable for hypotension. Patient is chronically anemic but no blood in her stool. Chest x-ray initially negative.   Continue to be persistently tensive despite resuscitation and CT of the chest abdomen pelvis performed showing likely community-acquired pneumonia. COVID-19 testing performed and negative. Started on Rocephin and azithromycin. Patient was started on a Levophed drip after central line placement will be admitted to the [facility] time was 30 minutes, excluding separately reportable procedures. There was a high probability of clinically significant/life threatening deterioration in the patient's condition which required my urgent intervention. This time was spent reviewing the patient chart, interpreting diagnostic/laboratory data, initiation maintenance of Levophed drip as well as transfusion of large-volume IV fluids and treatment for sepsis    Central Line    Date/Time: 1/12/2021 9:08 PM  Performed by: Nolan Angulo MD  Authorized by: Oscar Phelan MD     Consent:     Consent obtained:  Verbal    Consent given by:  Patient  Pre-procedure details:     Hand hygiene: Hand hygiene performed prior to insertion      Sterile barrier technique: All elements of maximal sterile technique followed      Skin preparation:  2% chlorhexidine    Skin preparation agent: Skin preparation agent completely dried prior to procedure    Anesthesia (see MAR for exact dosages):      Anesthesia method:  Local infiltration    Local anesthetic:  Lidocaine 1% WITH epi  Procedure details:     Location:  R internal jugular    Patient position:  Reverse Trendelenburg    Procedural supplies:  Triple lumen    Catheter size:  7.5 Fr    Landmarks identified: yes      Ultrasound guidance: yes      Sterile ultrasound techniques: Sterile gel and sterile probe covers were used      Number of attempts:  1    Successful placement: yes    Post-procedure details:     Post-procedure:  Dressing applied and line sutured    Assessment:  Blood return through all ports, no pneumothorax on x-ray, free fluid flow and placement verified by x-ray    Patient tolerance of procedure:   Tolerated well, no immediate complications        During the patient's ED course, the patient was given:  Medications   sodium chloride flush 0.9 % injection 10 mL (10 mLs Intravenous Not Given 1/12/21 1147)   sodium chloride flush 0.9 % injection 10 mL (has no administration in time range)   promethazine (PHENERGAN) tablet 12.5 mg (12.5 mg Oral Given 1/11/21 2106)     Or   ondansetron (ZOFRAN) injection 4 mg ( Intravenous See Alternative 1/11/21 2106)   polyethylene glycol (GLYCOLAX) packet 17 g (has no administration in time range)   acetaminophen (TYLENOL) tablet 650 mg (650 mg Oral Given 1/12/21 0957)     Or   acetaminophen (TYLENOL) suppository 650 mg ( Rectal See Alternative 1/12/21 0957)   0.9 % sodium chloride infusion ( Intravenous New Bag 1/12/21 1008)   pantoprazole (PROTONIX) 80 mg in sodium chloride 0.9 % 100 mL infusion (8 mg/hr Intravenous New Bag 1/12/21 0336)   piperacillin-tazobactam (ZOSYN) 3.375 g in sodium chloride 0.9 % 100 mL IVPB extended infusion (mini-bag) (0 g Intravenous Stopped 1/12/21 1600)   levothyroxine (SYNTHROID) tablet 175 mcg (175 mcg Oral Given 1/12/21 0957)   oxyCODONE-acetaminophen (PERCOCET) 5-325 MG per tablet 1 tablet (1 tablet Oral Given 1/12/21 1600)   0.9 % sodium chloride infusion (has no administration in time range)   0.9 % sodium chloride bolus (0 mLs Intravenous Stopped 1/11/21 1304)   ondansetron (ZOFRAN) injection 4 mg (4 mg Intravenous Given 1/11/21 1124)   0.9 % sodium chloride bolus (0 mLs Intravenous Stopped 1/11/21 1412)   acetaminophen (TYLENOL) tablet 650 mg (650 mg Oral Given 1/11/21 1306)   iopamidol (ISOVUE-370) 76 % injection 75 mL (75 mLs Intravenous Given 1/11/21 1424)   azithromycin (ZITHROMAX) 500 mg in D5W 250ml addavial (0 mg Intravenous Stopped 1/11/21 1800)   0.9 % sodium chloride bolus (0 mLs Intravenous Stopped 1/11/21 1800)   0.9 % sodium chloride infusion ( Intravenous Stopped

## 2021-01-11 NOTE — Clinical Note
Patient Class: Inpatient [101]   REQUIRED: Diagnosis: Sepsis ([facility] [address] [[id number]]   Estimated Length of Stay: Estimated stay of more than 2 midnights   Admitting Provider: Israel Arellano [[id number]]   Telemetry Bed Required?: Yes

## 2021-01-11 NOTE — PROGRESS NOTES
Admit to ICU  Sepsis, Pneumonia, COVID rule out    [facility]  1/11/2021

## 2021-01-11 NOTE — ED NOTES
Patient up to bedside commode without assistance. Yoon Garcia RN  01/11/21 8026  Patient back in bed, no other needs at this time. States that Tylenol did not help. Provider notified.      Yoon Garcia RN  01/11/21 6366

## 2021-01-11 NOTE — ED NOTES
Patient placed on 2 liters of oxygen for low O2 level 0f 88%  while sleeping.  Oxygen levels now at 99%     Loretta Castro RN  01/11/21 0964

## 2021-01-12 VITALS
SYSTOLIC BLOOD PRESSURE: 113 MMHG | HEIGHT: 68 IN | TEMPERATURE: 96.9 F | RESPIRATION RATE: 16 BRPM | BODY MASS INDEX: 24.25 KG/M2 | HEART RATE: 78 BPM | DIASTOLIC BLOOD PRESSURE: 73 MMHG | OXYGEN SATURATION: 95 % | WEIGHT: 160 LBS

## 2021-01-12 LAB
A/G RATIO: 1 (ref 1.1–2.2)
ABO/RH: NORMAL
ABO/RH: NORMAL
ALBUMIN SERPL-MCNC: 2.7 G/DL (ref 3.4–5)
ALP BLD-CCNC: 63 U/L (ref 40–129)
ALT SERPL-CCNC: 12 U/L (ref 10–40)
ANION GAP SERPL CALCULATED.3IONS-SCNC: 6 MMOL/L (ref 3–16)
ANTIBODY SCREEN: NORMAL
AST SERPL-CCNC: 14 U/L (ref 15–37)
BILIRUB SERPL-MCNC: <0.2 MG/DL (ref 0–1)
BUN BLDV-MCNC: 20 MG/DL (ref 7–20)
CALCIUM SERPL-MCNC: 8.3 MG/DL (ref 8.3–10.6)
CHLORIDE BLD-SCNC: 109 MMOL/L (ref 99–110)
CO2: 25 MMOL/L (ref 21–32)
CREAT SERPL-MCNC: 1 MG/DL (ref 0.6–1.1)
GFR AFRICAN AMERICAN: >60
GFR NON-AFRICAN AMERICAN: 59
GLOBULIN: 2.6 G/DL
GLUCOSE BLD-MCNC: 86 MG/DL (ref 70–99)
HCT VFR BLD CALC: 21.8 % (ref 36–48)
HCT VFR BLD CALC: 25.7 % (ref 36–48)
HCT VFR BLD CALC: 25.9 % (ref 36–48)
HEMOGLOBIN: 7.1 G/DL (ref 12–16)
HEMOGLOBIN: 7.7 G/DL (ref 12–16)
HEMOGLOBIN: 8.6 G/DL (ref 12–16)
INR BLD: 1.09 (ref 0.86–1.14)
MCH RBC QN AUTO: 33.4 PG (ref 26–34)
MCHC RBC AUTO-ENTMCNC: 32.8 G/DL (ref 31–36)
MCV RBC AUTO: 101.6 FL (ref 80–100)
PDW BLD-RTO: 13.6 % (ref 12.4–15.4)
PLATELET # BLD: 179 K/UL (ref 135–450)
PMV BLD AUTO: 7.5 FL (ref 5–10.5)
POTASSIUM REFLEX MAGNESIUM: 4.7 MMOL/L (ref 3.5–5.1)
PROTHROMBIN TIME: 12.7 SEC (ref 10–13.2)
RBC # BLD: 2.14 M/UL (ref 4–5.2)
SARS-COV-2: NOT DETECTED
SODIUM BLD-SCNC: 140 MMOL/L (ref 136–145)
TOTAL PROTEIN: 5.3 G/DL (ref 6.4–8.2)
WBC # BLD: 6.3 K/UL (ref 4–11)

## 2021-01-12 PROCEDURE — 2580000003 HC RX 258: Performed by: NURSE PRACTITIONER

## 2021-01-12 PROCEDURE — 80053 COMPREHEN METABOLIC PANEL: CPT

## 2021-01-12 PROCEDURE — 2580000003 HC RX 258: Performed by: HOSPITALIST

## 2021-01-12 PROCEDURE — 85610 PROTHROMBIN TIME: CPT

## 2021-01-12 PROCEDURE — 85018 HEMOGLOBIN: CPT

## 2021-01-12 PROCEDURE — 6370000000 HC RX 637 (ALT 250 FOR IP): Performed by: NURSE PRACTITIONER

## 2021-01-12 PROCEDURE — 36415 COLL VENOUS BLD VENIPUNCTURE: CPT

## 2021-01-12 PROCEDURE — 6360000002 HC RX W HCPCS: Performed by: HOSPITALIST

## 2021-01-12 PROCEDURE — 86901 BLOOD TYPING SEROLOGIC RH(D): CPT

## 2021-01-12 PROCEDURE — 82530 CORTISOL FREE: CPT

## 2021-01-12 PROCEDURE — 99232 SBSQ HOSP IP/OBS MODERATE 35: CPT | Performed by: NURSE PRACTITIONER

## 2021-01-12 PROCEDURE — 85014 HEMATOCRIT: CPT

## 2021-01-12 PROCEDURE — C9113 INJ PANTOPRAZOLE SODIUM, VIA: HCPCS | Performed by: HOSPITALIST

## 2021-01-12 PROCEDURE — 99233 SBSQ HOSP IP/OBS HIGH 50: CPT | Performed by: INTERNAL MEDICINE

## 2021-01-12 PROCEDURE — 6370000000 HC RX 637 (ALT 250 FOR IP): Performed by: HOSPITALIST

## 2021-01-12 PROCEDURE — 86850 RBC ANTIBODY SCREEN: CPT

## 2021-01-12 PROCEDURE — 85027 COMPLETE CBC AUTOMATED: CPT

## 2021-01-12 PROCEDURE — 86900 BLOOD TYPING SEROLOGIC ABO: CPT

## 2021-01-12 PROCEDURE — 86923 COMPATIBILITY TEST ELECTRIC: CPT

## 2021-01-12 PROCEDURE — 6360000002 HC RX W HCPCS: Performed by: PHYSICIAN ASSISTANT

## 2021-01-12 RX ORDER — OXYCODONE HYDROCHLORIDE AND ACETAMINOPHEN 5; 325 MG/1; MG/1
1 TABLET ORAL EVERY 6 HOURS PRN
Status: DISCONTINUED | OUTPATIENT
Start: 2021-01-12 | End: 2021-01-12 | Stop reason: HOSPADM

## 2021-01-12 RX ORDER — SODIUM CHLORIDE 9 MG/ML
INJECTION, SOLUTION INTRAVENOUS ONCE
Status: COMPLETED | OUTPATIENT
Start: 2021-01-12 | End: 2021-01-12

## 2021-01-12 RX ORDER — SODIUM CHLORIDE 9 MG/ML
INJECTION, SOLUTION INTRAVENOUS PRN
Status: DISCONTINUED | OUTPATIENT
Start: 2021-01-12 | End: 2021-01-12 | Stop reason: HOSPADM

## 2021-01-12 RX ORDER — HYDROCODONE BITARTRATE AND ACETAMINOPHEN 7.5; 325 MG/1; MG/1
1 TABLET ORAL EVERY 6 HOURS PRN
Status: DISCONTINUED | OUTPATIENT
Start: 2021-01-12 | End: 2021-01-12

## 2021-01-12 RX ADMIN — HYDROMORPHONE HYDROCHLORIDE 1 MG: 1 INJECTION, SOLUTION INTRAMUSCULAR; INTRAVENOUS; SUBCUTANEOUS at 18:31

## 2021-01-12 RX ADMIN — PIPERACILLIN SODIUM AND TAZOBACTAM SODIUM 3.38 G: 3; .375 INJECTION, POWDER, LYOPHILIZED, FOR SOLUTION INTRAVENOUS at 11:42

## 2021-01-12 RX ADMIN — PANTOPRAZOLE SODIUM 80 MG: 40 INJECTION, POWDER, FOR SOLUTION INTRAVENOUS at 02:45

## 2021-01-12 RX ADMIN — SODIUM CHLORIDE: 9 INJECTION, SOLUTION INTRAVENOUS at 10:08

## 2021-01-12 RX ADMIN — OXYCODONE HYDROCHLORIDE AND ACETAMINOPHEN 1 TABLET: 5; 325 TABLET ORAL at 16:00

## 2021-01-12 RX ADMIN — LEVOTHYROXINE SODIUM 175 MCG: 25 TABLET ORAL at 09:57

## 2021-01-12 RX ADMIN — ACETAMINOPHEN 650 MG: 325 TABLET ORAL at 09:57

## 2021-01-12 RX ADMIN — PANTOPRAZOLE SODIUM 8 MG/HR: 40 INJECTION, POWDER, FOR SOLUTION INTRAVENOUS at 03:36

## 2021-01-12 RX ADMIN — PIPERACILLIN SODIUM AND TAZOBACTAM SODIUM 3.38 G: 3; .375 INJECTION, POWDER, LYOPHILIZED, FOR SOLUTION INTRAVENOUS at 03:36

## 2021-01-12 RX ADMIN — ACETAMINOPHEN 650 MG: 325 TABLET ORAL at 03:37

## 2021-01-12 RX ADMIN — SODIUM CHLORIDE: 9 INJECTION, SOLUTION INTRAVENOUS at 00:53

## 2021-01-12 ASSESSMENT — PAIN DESCRIPTION - DESCRIPTORS
DESCRIPTORS: ACHING;DISCOMFORT
DESCRIPTORS: ACHING;DISCOMFORT
DESCRIPTORS: CONSTANT;ACHING

## 2021-01-12 ASSESSMENT — PAIN DESCRIPTION - PROGRESSION
CLINICAL_PROGRESSION: GRADUALLY WORSENING
CLINICAL_PROGRESSION: GRADUALLY WORSENING

## 2021-01-12 ASSESSMENT — PAIN DESCRIPTION - PAIN TYPE
TYPE: ACUTE PAIN
TYPE: ACUTE PAIN

## 2021-01-12 ASSESSMENT — PAIN DESCRIPTION - LOCATION
LOCATION: CHEST;ABDOMEN
LOCATION: ABDOMEN
LOCATION: OTHER (COMMENT)

## 2021-01-12 ASSESSMENT — PAIN DESCRIPTION - FREQUENCY
FREQUENCY: CONTINUOUS
FREQUENCY: CONTINUOUS

## 2021-01-12 ASSESSMENT — PAIN SCALES - GENERAL
PAINLEVEL_OUTOF10: 10
PAINLEVEL_OUTOF10: 10

## 2021-01-12 ASSESSMENT — PAIN DESCRIPTION - ONSET: ONSET: ON-GOING

## 2021-01-12 ASSESSMENT — PAIN DESCRIPTION - ORIENTATION: ORIENTATION: MID

## 2021-01-12 NOTE — ED NOTES
Patients fluid increased from 125 ml/hr to a 500 ml bolus per Dr Estrada Perdue.      Janine Siddiqui RN  01/12/21 2971

## 2021-01-12 NOTE — PROGRESS NOTES
Shift assessment completed:     Pt is a/o x4. VSS. C/o of 10/10 pain generalized throughout body. PRN tylenol 2 tabs PO given. Respirations are even, unlabored, diminished to lower lobes. SpO2 stable at 99% on RA. Pt stated coughing green sputum up. Right IJ triple lumen in place that is capped. PIV to RFA infusing NS @ 125 ml/hr & Protonix 10 ml/hr. Pt able to stand and ambulate to bathroom.      Lisa Alfred CNP notified of patient requesting medication for pain relief.      01/12/21 1000   Vitals   Temp 97.7 °F (36.5 °C)   Temp Source Oral   Pulse 79   Heart Rate Source Monitor   Resp 10   /87   MAP (mmHg) 97   BP Location Left upper arm   BP Upper/Lower Upper   BP Method Automatic   Patient Position Semi fowlers   Level of Consciousness Alert (0)   MEWS Score 0   Patient Currently in Pain Yes   Cardiac Rhythm NSR   Oxygen Therapy   SpO2 99 %   O2 Device None (Room air)   Pain Assessment   Pain Assessment 0-10   Pain Level 10   Pain Type Acute pain   Pain Location Chest;Abdomen   Pain Descriptors Aching;Discomfort   Patient's Stated Pain Goal 4   Pain Orientation Mid   Pain Frequency Continuous   Pain Onset On-going   Clinical Progression Gradually worsening

## 2021-01-12 NOTE — PROGRESS NOTES
Pulmonary Progress Note  CC: Sepsis    Subjective:  C/o pain all over nd SOB and cough with green sputum      EXAM: /74   Pulse 76   Temp 97.7 °F (36.5 °C) (Oral)   Resp 16   Ht 5' 8\" (1.727 m)   Wt 160 lb (72.6 kg)   SpO2 100%   BMI 24.33 kg/m²  on room air  Constitutional:  No acute distress. Eyes: PERRL. Conjunctivae anicteric. ENT: Normal nose. Normal tongue. Neck:  Trachea is midline. No thyroid tenderness. Respiratory: No accessory muscle usage. no decreased breath sounds. No wheezes. No rales. No Rhonchi. Cardiovascular: Normal S1S2. No digit clubbing. No digit cyanosis. No LE edema. Psychiatric: No anxiety or Agitation. Alert and Oriented to person, place and time. Scheduled Meds:   piperacillin-tazobactam  3.375 g Intravenous Q8H    levothyroxine  175 mcg Oral Daily    sodium chloride flush  10 mL Intravenous 2 times per day     Continuous Infusions:   pantoprozole (PROTONIX) infusion 8 mg/hr (01/12/21 0336)    sodium chloride 125 mL/hr at 01/12/21 1008     PRN Meds:  sodium chloride flush, promethazine **OR** ondansetron, polyethylene glycol, acetaminophen **OR** acetaminophen    Labs:  CBC:   Recent Labs     01/11/21  1125 01/12/21  0027 01/12/21  0638   WBC 8.9  --  6.3   HGB 8.3* 7.7* 7.1*   HCT 24.5* 25.9* 21.8*   .6*  --  101.6*     --  179     BMP:   Recent Labs     01/11/21  1125 01/12/21  0638   * 140   K 4.6 4.7    109   CO2 26 25   BUN 32* 20   CREATININE 1.4* 1.0       Cultures:  Resp cx ordered but not sent  PCT 0.08    Films:    Chest imaging was reviewed by me and showed 1/11/21 Chest CT:   1. Small focus of airspace disease in the right upper lobe is consistent with   focal pneumonia.    2. No acute intra-abdominal abnormality.            ASSESSMENT:  · Hypotension: the etiology is unclear, her pro calcitonin is normal but sepsis is suspected;  the small focus of pneumonia would not seem to explain the severity of her symptoms. · Pulmonary emphysema on Chest Ct  · Tobacco abuse  · SRIKANTH -improved  · Past Heroin and fentanyl abuse on Suboxone - living in rehab     PLAN:  · Resp cx - not collected  · CTX and azithromycin was adequate, I do not think she needs coverage for pseudomonas with no h/o this and a normal procalcitonin.   ·  I recommend treating with antibiotics for 7 days and consider a repeat CXR with her PCP in 6 weeks  · Bronchodilators as needed  · Tobacco cessation

## 2021-01-12 NOTE — CONSULTS
I received the notification about this consult for the first time today just a few minutes ago. It was through [employer]. The chart is reviewed. The patient will need an EGD for UGI bleeding. I will schedule it tomorrow. PRBC and supportive therapy as recommended by attending physician. PPI. The most likely etiology appears to be PUD or stress gastritis. If INR is elevated, Octreotide should be added to the regimen.

## 2021-01-12 NOTE — ED NOTES
Patient asking for more pain medication than Tylenol. Call will be placed to Dr Donna Paul to request additional medicine.      Army Pamela RN  01/12/21 3238

## 2021-01-12 NOTE — PROGRESS NOTES
Progress Note    Admit Date:  1/11/2021    48 y.o. female currently undergoing inpatient rehab for fentanyl abuse presents with a 3 day history of generalized body aches, sob, cough, chest discomfort worsening with deep breath/cough. has had generalized abdominal pain, nausea with dark coffee colored emesis, but denies diarrhea, black /bloody stools. Body pain has progressively worsened since onset with pain even with light touch. Currently she is in no respiratory distress on room air, however she remains hypotensive. Subjective:  Ms. Christy continues to c/o pain all over. She reports her arms, legs, chest, abdomen, back. H/o Fentanyl abuse. In inpatient rehab. She has been there for 45 days. Continues to ask for pain medications such as Dilaudid and Fentanyl.  (hyperfocused on pain and getting medication for this). Objective:   Patient Vitals for the past 4 hrs:   BP Pulse Resp SpO2   01/12/21 0637 97/60 75 16 98 %            Intake/Output Summary (Last 24 hours) at 1/12/2021 0838  Last data filed at 1/11/2021 1800  Gross per 24 hour   Intake 1300 ml   Output    Net 1300 ml       Physical Exam:  Gen: No distress. Alert. Eyes: PERRL. No sclera icterus. No conjunctival injection. ENT: No discharge. Pharynx clear. Neck: Trachea midline. Resp: No accessory muscle use. No crackles. No wheezes. No rhonchi. LS diminished RLL, LLL  CV: Regular rate. Regular rhythm. No murmur. No rub. No edema. Capillary Refill: Brisk,< 3 seconds   Peripheral Pulses: +2 palpable, equal bilaterally   GI: Diffusely tender. Non-distended. Normal bowel sounds. No hernia. Skin: Warm and dry. No nodule on exposed extremities. No rash on exposed extremities. M/S: No cyanosis. No joint deformity. No clubbing. Neuro: Awake. Grossly nonfocal    Psych: Oriented x 3.  No anxiety or agitation      Data:  CBC:   Recent Labs     01/11/21  1125 01/12/21  0027 01/12/21  0638   WBC 8.9  --  6.3   HGB 8.3* 7.7* 7.1* HCT 24.5* 25.9* 21.8*   .6*  --  101.6*     --  179     BMP:   Recent Labs     01/11/21  1125 01/12/21  0638   * 140   K 4.6 4.7    109   CO2 26 25   BUN 32* 20   CREATININE 1.4* 1.0     LIVER PROFILE:   Recent Labs     01/11/21  1125 01/12/21  0638   AST 9* 14*   ALT 8* 12   LIPASE 10.0*  --    BILITOT <0.2 <0.2   ALKPHOS 73 63     PT/INR: No results for input(s): PROTIME, INR in the last 72 hours. CULTURES  Blood: pending  Sputum: pending  COVID: not detected    RADIOLOGY  XR CHEST PORTABLE   Final Result   Right jugular central line projects in normal position. No pneumothorax. Focal airspace in the right upper lobe, most likely pneumonia. Surveillance   until resolution is recommended. CT CHEST ABDOMEN PELVIS W CONTRAST   Final Result   1. Small focus of airspace disease in the right upper lobe is consistent with   focal pneumonia. 2. No acute intra-abdominal abnormality. XR CHEST PORTABLE   Final Result   No evidence of acute cardiopulmonary disease. Assessment/Plan:  Hypotension  - patient was persistently hypotensive yesterday. - Central line placed. Received 3 L fluids, now on continuous IVF's  - BP stable today. Sepsis  Aspiration pneumonia  - POA  - possibly related to aspiration pneumonia   - changed to Zosyn D#1  - cx are pending. procalcitonin 0.08    Anemia  Hematemesis  - GI consult  - monitor H/H  - Hgb 7.1 today. - on PPI Drip. COVID rule out  - PCR negative. SRIKANTH  - Creatinine 1.4 on admission  - improved to 1 today with IVFs. Hypothyroidism  - home dose of synthroid 175 mcg     Drug seeking tendencies  - repeatedly asking for Dilaudid, fentanyl  - patient with h/o fentanyl abuse. In inpatient rehab. No use in 45 days. - would not order patient Narcotics.      DVT Prophylaxis: SCDs  Diet: DIET GENERAL;  Code Status: Full Code    Sonu SANTOS-C  1/12/2021

## 2021-01-12 NOTE — FLOWSHEET NOTE
01/12/21 1600   Pain Assessment   Pain Assessment 0-10   Pain Level 10   Pain Location Other (Comment)  (pt states she hurts \"everywhere\" )   Pain Descriptors Constant; Aching   Pain Type Acute pain   Patient's Stated Pain Goal 2     Writer accessed pt chart to help [employer]. Writer administered PRN Percocet see MAR  Pt requesting to see a DR. No other complaints at this time, call light in reach.  Jovanna Rodriguez RN

## 2021-01-12 NOTE — PROGRESS NOTES
Bedside report given to [facility]. Pt to be transferred to 2 room via Wheelchair. Tele monitoring stopped. 1:29 PM  Clinical Called. Pt is upset- feeling like pain is not properly being treated. Pt states she can leave and feel ill at home rather than stay in hospital (leave AMA). Pt informed that she has a possible GI bleed, as well as PNA that we are treating. Pt educated on the importance to receive medical treatment for this- now, while she in inpatient. Pt informed there was a call placed to clinical.     2:30 PM    Pt left with transport to 2.

## 2021-01-12 NOTE — CARE COORDINATION
Case Management Assessment  Initial Evaluation      Patient Name: Aminata Braswell  YOB: 1970  Diagnosis: Sepsis [facility]) [A41.9]  Date / Time: 1/11/2021 10:27 AM    Admission status/Date: 01/11/2021 Inpatient   Chart Reviewed: Yes. Droplet Plus Isolation       Patient Interviewed: No pt did not answer bedside phone. Pt's contact information was listed as her daughters. Family Interviewed:  Yes - pt's daughter Ivan Asher at [phone]    Hospitalization in the last 30 days: No      Health Care Decision Maker :     (CM - must 1st enter selection under Navigator - emergency contact- Brigette 8 Relationship and pick relationship)   Who do you trust or have selected to make healthcare decisions for you      Met with:pt's daughter   Any Shaikh conducted  (bedside/phone): phone    Current PCP: None in the community; pt originally from [facility] required for SNF : Y          3 night stay required -  [employer]  Support Systems/Care Needs:  family  Transportation: family  /friends  Meal Preparation: self and rehab    Housing  Living Arrangements: Pt has been ar [facility] in [facility] for Substance Abuse Treatment. Prior to the SA treatment, pt was homeless.   Steps: none  Intent for return to present living arrangements: Yes per pt's daughter  Identified Issues: none    Home Care Information  Active with [email or web address] : No Agency:(Services)     Passport/Waiver : No  :                      Phone Number:    Passport/Waiver Services: n/a         Durable Medical Equiptment   DME Provider:   Equipment:   Walker___Cane___RTS___ BSC___Shower Chair___Hospital Bed___W/C____Other________  02 at ____Liter(s)---wears(frequency)_______ HHN ___ CPAP___ BiPap___   N/A__x__      Home O2 Use :  No    If No for home O2---if presently on O2 during hospitalization:  No  if yes CM to follow for potential DC O2 need  Informed of need for care provider to bring portable home O2 tank on day of discharge for nursing to connect prior to leaving:   Not Indicated  Verbalized agreement/Understanding:   Not Indicated    Community Service Affiliation  Dialysis:  No    · Agency:  · Location:  · Dialysis Schedule:  · Phone:   · Fax: Other Community Services: Getting SA treatment at [facility] in [facility]: Explained Case Management role/services. Chart review completed. Pt is in the ED waiting for an ICU bed. Attempted calling pt's bedside phone but there was no answer. Called and spoke with pt's daughter Asia Waite as pt is not listed as private encounter in epic at this moment. Ines stated that pt is from [facility] in [facility] and the plan is for her to return there when discharged. She stated that pt is normally independent but her \"legs have become weak\". She stated pt does not have a cell phone. She stated that she just spoke with pt on the room phone in the ED but she may have went back to sleep. She stated that when she talks to pt next, she will let her know that CM attempted to contact her and will contact her at some point to confirm plans. She stated understanding and denied questions or concerns for CM at this time. CM will follow and assist with discharge plans. Please notify CM if needs or concerns arise. Addendum at 2:43pm: Attempted calling pt's bedside phone but there was no answer. CM will continue to attempt to make contact with pt once admitted to the floor to confirm she will return to [facility] rehab on discharge     Addendum at 3:14pm: Called and spoke with pt via bedside phone. Pt confirms the place is for her to return to [facility]" when discharged. She provided verbal permission to writer that CM can speak with [facility] if needed and provide information to them. She is aware CM will follow and assist with getting her back to [facility] when discharged.  She stated understanding and denied questions or concerns for CM at this time.

## 2021-01-12 NOTE — ED NOTES
Perfect Serve to nocturnist:    BP 81/54(63) NS infusing at 125 ml/hr. Pt has a central line. Thanks, Nusrat      Lab notified of midnight H&H.  Son Dunaway Clinical

## 2021-01-12 NOTE — ED NOTES
Patient provided with drink and box lunch. No other needs at this time.      Harriet Mckeon RN  01/11/21 4633

## 2021-01-12 NOTE — H&P
Hospital Medicine History & Physical      PCP: No primary care provider on file. Date of Admission: 1/11/2021    Date of Service: Pt seen/examined on 1/11/2021 and Admitted to Inpatient with expected LOS greater than two midnights due to medical therapy. Chief Complaint:  Generalized body aches      History Of Present Illness:       48 y.o. female currently undergoing inpatient rehab for fentanyl abuse presents with a 3 day history of generalized body aches, sob, cough, chest discomfort worsening with deep breath/cough. She denies fevers, has had generalized abdominal pain, nausea with dark coffee colored emesis, but denies diarrhea, black /bloody stools. Body pain has progressively worsened since onset with pain even with light touch. Currently she is in no respiratory distress on room air, however she remains hypotensive. Past Medical History:          Diagnosis Date    Chronic pancreatitis ([address]     COPD (chronic obstructive pulmonary disease) (HCC)        Past Surgical History:          Procedure Laterality Date    CHOLECYSTECTOMY      HYSTERECTOMY      THYROIDECTOMY         Medications Prior to Admission:      Prior to Admission medications    Medication Sig Start Date End Date Taking?  Authorizing Provider   ferrous sulfate (IRON 325) 325 (65 Fe) MG tablet Take 325 mg by mouth daily (with breakfast)   Yes Historical Provider, MD   Ascorbic Acid (VITAMIN C PO) Take by mouth   Yes Historical Provider, MD   TRAZODONE HCL PO Take 200 mg by mouth nightly   Yes Historical Provider, MD   nitrofurantoin, macrocrystal-monohydrate, (MACROBID) 100 MG capsule Take 100 mg by mouth 2 times daily   Yes Historical Provider, MD   doxycycline hyclate (VIBRAMYCIN) 100 MG capsule Take 100 mg by mouth 2 times daily   Yes Historical Provider, MD   Cholecalciferol (VITAMIN D3 PO) Take 2,000 Units by mouth daily    Yes Historical Provider, MD   atorvastatin (LIPITOR) 20 MG tablet Take 20 mg by mouth daily   Yes Historical Provider, MD   naproxen (NAPROSYN) 500 MG tablet Take 500 mg by mouth 2 times daily as needed for Pain   Yes Historical Provider, MD   gabapentin (NEURONTIN) 400 MG capsule Take 400 mg by mouth 3 times daily. Yes Historical Provider, MD   lisinopril (PRINIVIL;ZESTRIL) 5 MG tablet Take 5 mg by mouth daily   Yes Historical Provider, MD   loratadine (CLARITIN) 10 MG capsule Take 10 mg by mouth daily   Yes Historical Provider, MD   DULoxetine (CYMBALTA) 30 MG extended release capsule Take 30 mg by mouth daily   Yes Historical Provider, MD   buprenorphine (SUBUTEX) 8 MG SUBL SL tablet Place 8 mg under the tongue daily. 8/2   Yes Historical Provider, MD   pramipexole (MIRAPEX) 0.25 MG tablet Take 0.25 mg by mouth 3 times daily   Yes Historical Provider, MD   omeprazole (PRILOSEC) 20 MG delayed release capsule Take 20 mg by mouth daily   Yes Historical Provider, MD   levothyroxine (SYNTHROID) 175 MCG tablet Take 175 mcg by mouth Daily   Yes Historical Provider, MD   albuterol sulfate HFA (PROVENTIL HFA) 108 (90 Base) MCG/ACT inhaler Inhale 2 puffs into the lungs every 4 hours as needed for Wheezing 12/28/20  Yes Ofelia Bishop,        Allergies:  Aspirin, Toradol [ketorolac tromethamine], Tramadol, and Vicodin [hydrocodone-acetaminophen]    Social History:       TOBACCO:   reports that she has been smoking. She has been smoking about 0.25 packs per day. She has never used smokeless tobacco.  ETOH:   reports no history of alcohol use. Family History:           History reviewed. No pertinent family history. REVIEW OF SYSTEMS:   Pertinent positives as noted in the HPI. All other systems reviewed and negative. PHYSICAL EXAM PERFORMED:    BP (!) 91/56   Pulse 78   Temp 98.3 °F (36.8 °C) (Oral)   Resp 17   Ht 5' 8\" (1.727 m)   Wt 160 lb (72.6 kg)   SpO2 97%   BMI 24.33 kg/m²     General appearance:  No apparent distress, appears stated age and cooperative.   HEENT:  Normal cephalic, atraumatic without obvious deformity. Pupils equal, round, and reactive to light. Extra ocular muscles intact. Conjunctivae/corneas clear. Neck: Supple, with full range of motion. No jugular venous distention. Trachea midline. Respiratory:  Normal respiratory effort. Equal excursion bilat, no use of accessory muscles, no intercostal retractions. Cardiovascular:  Regular rate and rhythm with    Abdomen: Soft, (+) tender, non-distended   Musculoskeletal:  No clubbing, cyanosis or edema bilaterally. Skin: Skin color, texture, turgor normal.   Psychiatric:  Alert and oriented, thought content appropriate, normal insight         Labs:     Recent Labs     01/11/21  1125 01/12/21  0027   WBC 8.9  --    HGB 8.3* 7.7*   HCT 24.5* 25.9*     --      Recent Labs     01/11/21  1125   *   K 4.6      CO2 26   BUN 32*   CREATININE 1.4*   CALCIUM 8.9     Recent Labs     01/11/21  1125   AST 9*   ALT 8*   BILITOT <0.2   ALKPHOS 73     No results for input(s): INR in the last 72 hours. Recent Labs     01/11/21  1125   CKTOTAL 70   TROPONINI <0.01       Urinalysis:      Lab Results   Component Value Date    NITRU Negative 01/11/2021    WBCUA None seen 01/11/2021    RBCUA 3-4 01/11/2021    BLOODU TRACE-INTACT 01/11/2021    SPECGRAV 1.010 01/11/2021    GLUCOSEU Negative 01/11/2021       Radiology:          XR CHEST PORTABLE   Final Result   Right jugular central line projects in normal position. No pneumothorax. Focal airspace in the right upper lobe, most likely pneumonia. Surveillance   until resolution is recommended. CT CHEST ABDOMEN PELVIS W CONTRAST   Final Result   1. Small focus of airspace disease in the right upper lobe is consistent with   focal pneumonia. 2. No acute intra-abdominal abnormality. XR CHEST PORTABLE   Final Result   No evidence of acute cardiopulmonary disease.              ASSESSMENT:    Active Hospital Problems    Diagnosis Date Noted    Sepsis ([facility] [address] [A41.9] 01/11/2021         PLAN:    Hypotension  - unclear etio, sepsis vs anemia  - check cortisol    Sepsis  - asp pna vs covid, will change to zosyn for poss asp?  - lactic acid normal, normal anion gap/bicarb  - IVF bolus  - follow up blood cultures    Anemia  - check stool for occult blood  - iron stores adequate 12/2020  - PPI    Hypothyroidism  - nl tsh  - continue replacement therapy    DVT Prophylaxis: scd  Diet: DIET GENERAL;  Code Status: Full Code       Dispo - ICU  Tot crit care time > 35 min       Suzie Smith MD    Thank you No primary care provider on file. for the opportunity to be involved in this patient's care. If you have any questions or concerns please feel free to contact me at [phone].

## 2021-01-13 ENCOUNTER — TELEPHONE (OUTPATIENT)
Dept: GASTROENTEROLOGY | Age: 51
End: 2021-01-13

## 2021-01-13 NOTE — PROGRESS NOTES
Patient left AMA. Patient was unhappy the pain medication she wanted was not ordered. IVs were removed and patient taken to ER to wait for her ride.

## 2021-01-13 NOTE — TELEPHONE ENCOUNTER
MAs in the office do not have access to schedule any inpt. The intent is to let the in-charge nurse in the endoscopy know that a particular inpatient needs to be scheduled. This gives the nurse heads up and when she gets confirmation with anesthesia person, she knows to call me to let me know. I will add the word 'notification of the need to schedule' to my messages like this one so that MAs other than Xiomara Landaverde are more clear about the intent.

## 2021-01-15 LAB
BLOOD CULTURE, ROUTINE: NORMAL
CULTURE, BLOOD 2: NORMAL

## 2021-01-16 LAB
BLOOD BANK DISPENSE STATUS: NORMAL
BLOOD BANK PRODUCT CODE: NORMAL
BPU ID: NORMAL
DESCRIPTION BLOOD BANK: NORMAL

## 2021-01-17 LAB — CORTISOL FREE, SERUM: 0.16 UG/DL

## 2021-01-27 NOTE — DISCHARGE SUMMARY
Name:  Kirsten Fonseca  Room:  0211/0211-02  MRN:    [id number]    [facility] Discharge Summary      This discharge summary is in conjunction with a complete physical exam done on the day of discharge. Discharging Provider: Elisha Moritz FNP-C      Admit: 1/11/2021  Discharge:  1/12/2021    HPI:  48 y.o. female currently undergoing inpatient rehab for fentanyl abuse presents with a 3 day history of generalized body aches, sob, cough, chest discomfort worsening with deep breath/cough. She denies fevers, has had generalized abdominal pain, nausea with dark coffee colored emesis, but denies diarrhea, black /bloody stools. Body pain has progressively worsened since onset with pain even with light touch. Currently she is in no respiratory distress on room air, however she remains hypotensive. Diagnoses this Admission and Hospital Course   Hypotension  - patient was persistently hypotensive yesterday. - Central line placed. Received 3 L fluids, now on continuous IVF's  - BP stable today.      Sepsis  Aspiration pneumonia  - POA  - possibly related to aspiration pneumonia   - changed to Zosyn D#1  - cx are pending. procalcitonin 0.08     Anemia  Hematemesis  - GI consulted  - monitored H/H  - Hgb 7.1 today. - on PPI Drip.      COVID rule out  - PCR negative.      SRIKANTH  - Creatinine 1.4 on admission  - improved to 1 today with IVFs.    Hypothyroidism  - home dose of synthroid 175 mcg      Drug seeking tendencies  - repeatedly asking for Dilaudid, fentanyl  - patient with h/o fentanyl abuse. In inpatient rehab. No use in 45 days.    - would not order patient Narcotics.      DVT Prophylaxis: SCDs    Patient left AMA    Procedures (Please Review Full Report for Details)  N/A    Consults    GI  Pulmonology       Physical Exam at Discharge:    /73   Pulse 78   Temp 96.9 °F (36.1 °C) (Oral)   Resp 16   Ht 5' 8\" (1.727 m)   Wt 160 lb (72.6 kg)   SpO2 95%   BMI 24.33 kg/m²     See today's progress note      U/A:    Lab Results   Component Value Date    COLORU Yellow 01/11/2021    WBCUA None seen 01/11/2021    RBCUA 3-4 01/11/2021    CLARITYU Clear 01/11/2021    SPECGRAV 1.010 01/11/2021    LEUKOCYTESUR Negative 01/11/2021    BLOODU TRACE-INTACT 01/11/2021    GLUCOSEU Negative 01/11/2021       CULTURES  Blood: pending  Sputum: pending  COVID: not detected    RADIOLOGY  XR CHEST PORTABLE   Final Result   Right jugular central line projects in normal position. No pneumothorax. Focal airspace in the right upper lobe, most likely pneumonia. Surveillance   until resolution is recommended. CT CHEST ABDOMEN PELVIS W CONTRAST   Final Result   1. Small focus of airspace disease in the right upper lobe is consistent with   focal pneumonia. 2. No acute intra-abdominal abnormality. XR CHEST PORTABLE   Final Result   No evidence of acute cardiopulmonary disease.                Discharge Medications     Medication List      ASK your doctor about these medications    albuterol sulfate  (90 Base) MCG/ACT inhaler  Commonly known as: Proventil HFA  Inhale 2 puffs into the lungs every 4 hours as needed for Wheezing     atorvastatin 20 MG tablet  Commonly known as: LIPITOR     buprenorphine 8 MG Subl SL tablet  Commonly known as: SUBUTEX     Claritin 10 MG capsule  Generic drug: loratadine     doxycycline hyclate 100 MG capsule  Commonly known as: VIBRAMYCIN     DULoxetine 30 MG extended release capsule  Commonly known as: CYMBALTA     ferrous sulfate 325 (65 Fe) MG tablet  Commonly known as: IRON 325     gabapentin 400 MG capsule  Commonly known as: NEURONTIN     levothyroxine 175 MCG tablet  Commonly known as: SYNTHROID     lisinopril 5 MG tablet  Commonly known as: PRINIVIL;ZESTRIL     Macrobid 100 MG capsule  Generic drug: nitrofurantoin (macrocrystal-monohydrate)     Naprosyn 500 MG tablet  Generic drug: naproxen     omeprazole 20 MG delayed release capsule  Commonly known as: PRILOSEC pramipexole 0.25 MG tablet  Commonly known as: MIRAPEX     TRAZODONE HCL PO     VITAMIN C PO     VITAMIN D3 PO              Patient left [facility]MA.

## 2021-03-03 RX ORDER — LISINOPRIL 5 MG/1
TABLET ORAL
Qty: 30 TABLET | Refills: 0 | OUTPATIENT
Start: 2021-03-03

## 2021-03-03 RX ORDER — OMEPRAZOLE 20 MG/1
CAPSULE, DELAYED RELEASE ORAL
Qty: 30 CAPSULE | Refills: 0 | OUTPATIENT
Start: 2021-03-03

## 2021-03-03 RX ORDER — LEVOTHYROXINE SODIUM 175 UG/1
TABLET ORAL
Qty: 30 TABLET | Refills: 0 | OUTPATIENT
Start: 2021-03-03

## 2021-03-08 NOTE — ED TRIAGE NOTES
Chief Complaint   Patient presents with    Concern For COVID-19     chest pain, sob and body aches.  had an exposur. e to covid     Preferred Pharmacy:    Primary Care Provider:  :  billie  Phone number:  [phone]  Relationship:  daughter Marlon new insurance  [place] refill for new pharmacy    Annual due 8/2021